# Patient Record
Sex: FEMALE | Race: WHITE | NOT HISPANIC OR LATINO | Employment: FULL TIME | URBAN - METROPOLITAN AREA
[De-identification: names, ages, dates, MRNs, and addresses within clinical notes are randomized per-mention and may not be internally consistent; named-entity substitution may affect disease eponyms.]

---

## 2017-05-14 PROCEDURE — 99283 EMERGENCY DEPT VISIT LOW MDM: CPT

## 2017-05-15 ENCOUNTER — HOSPITAL ENCOUNTER (EMERGENCY)
Facility: HOSPITAL | Age: 27
Discharge: HOME | End: 2017-05-15
Attending: EMERGENCY MEDICINE | Admitting: EMERGENCY MEDICINE

## 2017-05-15 VITALS
DIASTOLIC BLOOD PRESSURE: 79 MMHG | WEIGHT: 111 LBS | TEMPERATURE: 98.5 F | HEART RATE: 71 BPM | BODY MASS INDEX: 20.43 KG/M2 | OXYGEN SATURATION: 100 % | RESPIRATION RATE: 16 BRPM | HEIGHT: 62 IN | SYSTOLIC BLOOD PRESSURE: 103 MMHG

## 2017-05-15 DIAGNOSIS — L23.7 ALLERGIC CONTACT DERMATITIS DUE TO PLANTS, EXCEPT FOOD: Primary | ICD-10-CM

## 2017-05-15 PROCEDURE — 63710000001 PREDNISONE PER 1 MG: Performed by: EMERGENCY MEDICINE

## 2017-05-15 RX ORDER — PREDNISONE 10 MG/1
TABLET ORAL
Qty: 42 TABLET | Refills: 0 | Status: SHIPPED | OUTPATIENT
Start: 2017-05-15 | End: 2021-07-02

## 2017-05-15 RX ORDER — PREDNISONE 20 MG/1
60 TABLET ORAL ONCE
Status: COMPLETED | OUTPATIENT
Start: 2017-05-15 | End: 2017-05-15

## 2017-05-15 RX ORDER — HYDROXYZINE PAMOATE 25 MG/1
25 CAPSULE ORAL 3 TIMES DAILY PRN
Qty: 20 CAPSULE | Refills: 0 | Status: SHIPPED | OUTPATIENT
Start: 2017-05-15 | End: 2021-07-02

## 2017-05-15 RX ADMIN — PREDNISONE 60 MG: 20 TABLET ORAL at 03:20

## 2021-01-28 ENCOUNTER — IMMUNIZATION (OUTPATIENT)
Dept: VACCINE CLINIC | Facility: HOSPITAL | Age: 31
End: 2021-01-28

## 2021-01-28 PROCEDURE — 0001A: CPT | Performed by: INTERNAL MEDICINE

## 2021-01-28 PROCEDURE — 91300 HC SARSCOV02 VAC 30MCG/0.3ML IM: CPT | Performed by: INTERNAL MEDICINE

## 2021-02-18 ENCOUNTER — IMMUNIZATION (OUTPATIENT)
Dept: VACCINE CLINIC | Facility: HOSPITAL | Age: 31
End: 2021-02-18

## 2021-02-18 PROCEDURE — 91300 HC SARSCOV02 VAC 30MCG/0.3ML IM: CPT | Performed by: INTERNAL MEDICINE

## 2021-02-18 PROCEDURE — 0002A: CPT | Performed by: INTERNAL MEDICINE

## 2021-07-02 ENCOUNTER — APPOINTMENT (OUTPATIENT)
Dept: ULTRASOUND IMAGING | Facility: HOSPITAL | Age: 31
End: 2021-07-02

## 2021-07-02 ENCOUNTER — HOSPITAL ENCOUNTER (INPATIENT)
Facility: HOSPITAL | Age: 31
LOS: 3 days | Discharge: HOME | End: 2021-07-05
Attending: EMERGENCY MEDICINE | Admitting: OBSTETRICS & GYNECOLOGY

## 2021-07-02 ENCOUNTER — ANESTHESIA (OUTPATIENT)
Dept: PERIOP | Facility: HOSPITAL | Age: 31
End: 2021-07-02

## 2021-07-02 ENCOUNTER — ANESTHESIA EVENT (OUTPATIENT)
Dept: PERIOP | Facility: HOSPITAL | Age: 31
End: 2021-07-02

## 2021-07-02 DIAGNOSIS — O00.90 ECTOPIC PREGNANCY: ICD-10-CM

## 2021-07-02 DIAGNOSIS — R10.2 PELVIC PAIN: Primary | ICD-10-CM

## 2021-07-02 DIAGNOSIS — Z98.890 POST-OPERATIVE STATE: ICD-10-CM

## 2021-07-02 DIAGNOSIS — O00.90 RUPTURED ECTOPIC PREGNANCY: ICD-10-CM

## 2021-07-02 DIAGNOSIS — O00.80 PREGNANCY, ECTOPIC, CORNUAL OR CERVICAL: ICD-10-CM

## 2021-07-02 LAB
ABO GROUP BLD: NORMAL
ABO GROUP BLD: NORMAL
ALBUMIN SERPL-MCNC: 4.5 G/DL (ref 3.5–5.2)
ALBUMIN/GLOB SERPL: 2 G/DL
ALP SERPL-CCNC: 49 U/L (ref 39–117)
ALT SERPL W P-5'-P-CCNC: 10 U/L (ref 1–33)
ANION GAP SERPL CALCULATED.3IONS-SCNC: 11 MMOL/L (ref 5–15)
AST SERPL-CCNC: 14 U/L (ref 1–32)
BACTERIA UR QL AUTO: ABNORMAL /HPF
BASOPHILS # BLD AUTO: 0.04 10*3/MM3 (ref 0–0.2)
BASOPHILS NFR BLD AUTO: 0.3 % (ref 0–1.5)
BILIRUB SERPL-MCNC: 0.5 MG/DL (ref 0–1.2)
BILIRUB UR QL STRIP: NEGATIVE
BLD GP AB SCN SERPL QL: NEGATIVE
BUN SERPL-MCNC: 8 MG/DL (ref 6–20)
BUN/CREAT SERPL: 11.8 (ref 7–25)
CALCIUM SPEC-SCNC: 10.3 MG/DL (ref 8.6–10.5)
CHLORIDE SERPL-SCNC: 101 MMOL/L (ref 98–107)
CLARITY UR: ABNORMAL
CO2 SERPL-SCNC: 20 MMOL/L (ref 22–29)
COLOR UR: YELLOW
CREAT SERPL-MCNC: 0.68 MG/DL (ref 0.57–1)
DEPRECATED RDW RBC AUTO: 38.6 FL (ref 37–54)
EOSINOPHIL # BLD AUTO: 0 10*3/MM3 (ref 0–0.4)
EOSINOPHIL NFR BLD AUTO: 0 % (ref 0.3–6.2)
ERYTHROCYTE [DISTWIDTH] IN BLOOD BY AUTOMATED COUNT: 12.4 % (ref 12.3–15.4)
GFR SERPL CREATININE-BSD FRML MDRD: 102 ML/MIN/1.73
GLOBULIN UR ELPH-MCNC: 2.2 GM/DL
GLUCOSE SERPL-MCNC: 116 MG/DL (ref 65–99)
GLUCOSE UR STRIP-MCNC: NEGATIVE MG/DL
HCG INTACT+B SERPL-ACNC: 2355 MIU/ML
HCT VFR BLD AUTO: 33.5 % (ref 34–46.6)
HGB BLD-MCNC: 11.6 G/DL (ref 12–15.9)
HGB UR QL STRIP.AUTO: ABNORMAL
HOLD SPECIMEN: NORMAL
HYALINE CASTS UR QL AUTO: ABNORMAL /LPF
IMM GRANULOCYTES # BLD AUTO: 0.05 10*3/MM3 (ref 0–0.05)
IMM GRANULOCYTES NFR BLD AUTO: 0.4 % (ref 0–0.5)
KETONES UR QL STRIP: ABNORMAL
LEUKOCYTE ESTERASE UR QL STRIP.AUTO: ABNORMAL
LIPASE SERPL-CCNC: 16 U/L (ref 13–60)
LYMPHOCYTES # BLD AUTO: 1.72 10*3/MM3 (ref 0.7–3.1)
LYMPHOCYTES NFR BLD AUTO: 12.1 % (ref 19.6–45.3)
MCH RBC QN AUTO: 29.5 PG (ref 26.6–33)
MCHC RBC AUTO-ENTMCNC: 34.6 G/DL (ref 31.5–35.7)
MCV RBC AUTO: 85.2 FL (ref 79–97)
MONOCYTES # BLD AUTO: 0.57 10*3/MM3 (ref 0.1–0.9)
MONOCYTES NFR BLD AUTO: 4 % (ref 5–12)
MUCOUS THREADS URNS QL MICRO: ABNORMAL /HPF
NEUTROPHILS NFR BLD AUTO: 11.78 10*3/MM3 (ref 1.7–7)
NEUTROPHILS NFR BLD AUTO: 83.2 % (ref 42.7–76)
NITRITE UR QL STRIP: NEGATIVE
NRBC BLD AUTO-RTO: 0 /100 WBC (ref 0–0.2)
PH UR STRIP.AUTO: 5.5 [PH] (ref 5–8)
PLATELET # BLD AUTO: 237 10*3/MM3 (ref 140–450)
PMV BLD AUTO: 10.1 FL (ref 6–12)
POTASSIUM SERPL-SCNC: 4.1 MMOL/L (ref 3.5–5.2)
PROT SERPL-MCNC: 6.7 G/DL (ref 6–8.5)
PROT UR QL STRIP: ABNORMAL
RBC # BLD AUTO: 3.93 10*6/MM3 (ref 3.77–5.28)
RBC # UR: ABNORMAL /HPF
REF LAB TEST METHOD: ABNORMAL
RH BLD: POSITIVE
RH BLD: POSITIVE
SARS-COV-2 RDRP RESP QL NAA+PROBE: NORMAL
SODIUM SERPL-SCNC: 132 MMOL/L (ref 136–145)
SP GR UR STRIP: 1.03 (ref 1–1.03)
SQUAMOUS #/AREA URNS HPF: ABNORMAL /HPF
T&S EXPIRATION DATE: NORMAL
UROBILINOGEN UR QL STRIP: ABNORMAL
WBC # BLD AUTO: 14.16 10*3/MM3 (ref 3.4–10.8)
WBC UR QL AUTO: ABNORMAL /HPF
WHOLE BLOOD HOLD SPECIMEN: NORMAL

## 2021-07-02 PROCEDURE — 25010000002 SUCCINYLCHOLINE PER 20 MG: Performed by: ANESTHESIOLOGY

## 2021-07-02 PROCEDURE — 88305 TISSUE EXAM BY PATHOLOGIST: CPT | Performed by: OBSTETRICS & GYNECOLOGY

## 2021-07-02 PROCEDURE — 84702 CHORIONIC GONADOTROPIN TEST: CPT | Performed by: EMERGENCY MEDICINE

## 2021-07-02 PROCEDURE — 10T20ZZ RESECTION OF PRODUCTS OF CONCEPTION, ECTOPIC, OPEN APPROACH: ICD-10-PCS | Performed by: OBSTETRICS & GYNECOLOGY

## 2021-07-02 PROCEDURE — 85025 COMPLETE CBC W/AUTO DIFF WBC: CPT | Performed by: EMERGENCY MEDICINE

## 2021-07-02 PROCEDURE — C1889 IMPLANT/INSERT DEVICE, NOC: HCPCS | Performed by: OBSTETRICS & GYNECOLOGY

## 2021-07-02 PROCEDURE — G0378 HOSPITAL OBSERVATION PER HR: HCPCS

## 2021-07-02 PROCEDURE — 25010000002 ONDANSETRON PER 1 MG: Performed by: EMERGENCY MEDICINE

## 2021-07-02 PROCEDURE — 25010000002 PROPOFOL 10 MG/ML EMULSION: Performed by: ANESTHESIOLOGY

## 2021-07-02 PROCEDURE — 25010000002 FENTANYL CITRATE (PF) 50 MCG/ML SOLUTION: Performed by: ANESTHESIOLOGY

## 2021-07-02 PROCEDURE — 86900 BLOOD TYPING SEROLOGIC ABO: CPT

## 2021-07-02 PROCEDURE — 99284 EMERGENCY DEPT VISIT MOD MDM: CPT

## 2021-07-02 PROCEDURE — 25010000003 CEFAZOLIN IN DEXTROSE 2-4 GM/100ML-% SOLUTION: Performed by: OBSTETRICS & GYNECOLOGY

## 2021-07-02 PROCEDURE — 81001 URINALYSIS AUTO W/SCOPE: CPT | Performed by: EMERGENCY MEDICINE

## 2021-07-02 PROCEDURE — 25010000002 NEOSTIGMINE 10 MG/10ML SOLUTION: Performed by: ANESTHESIOLOGY

## 2021-07-02 PROCEDURE — 87635 SARS-COV-2 COVID-19 AMP PRB: CPT | Performed by: EMERGENCY MEDICINE

## 2021-07-02 PROCEDURE — 99024 POSTOP FOLLOW-UP VISIT: CPT | Performed by: OBSTETRICS & GYNECOLOGY

## 2021-07-02 PROCEDURE — 80053 COMPREHEN METABOLIC PANEL: CPT | Performed by: EMERGENCY MEDICINE

## 2021-07-02 PROCEDURE — 25010000002 ONDANSETRON PER 1 MG: Performed by: ANESTHESIOLOGY

## 2021-07-02 PROCEDURE — 25010000002 HYDROMORPHONE PER 4 MG: Performed by: ANESTHESIOLOGY

## 2021-07-02 PROCEDURE — 0UJD4ZZ INSPECTION OF UTERUS AND CERVIX, PERCUTANEOUS ENDOSCOPIC APPROACH: ICD-10-PCS | Performed by: OBSTETRICS & GYNECOLOGY

## 2021-07-02 PROCEDURE — 86901 BLOOD TYPING SEROLOGIC RH(D): CPT

## 2021-07-02 PROCEDURE — 86850 RBC ANTIBODY SCREEN: CPT | Performed by: EMERGENCY MEDICINE

## 2021-07-02 PROCEDURE — 86900 BLOOD TYPING SEROLOGIC ABO: CPT | Performed by: EMERGENCY MEDICINE

## 2021-07-02 PROCEDURE — 0W9J0ZZ DRAINAGE OF PELVIC CAVITY, OPEN APPROACH: ICD-10-PCS | Performed by: OBSTETRICS & GYNECOLOGY

## 2021-07-02 PROCEDURE — 76817 TRANSVAGINAL US OBSTETRIC: CPT

## 2021-07-02 PROCEDURE — 59136 TREAT ECTOPIC PREGNANCY: CPT | Performed by: OBSTETRICS & GYNECOLOGY

## 2021-07-02 PROCEDURE — 86923 COMPATIBILITY TEST ELECTRIC: CPT

## 2021-07-02 PROCEDURE — 83690 ASSAY OF LIPASE: CPT | Performed by: EMERGENCY MEDICINE

## 2021-07-02 PROCEDURE — 25010000002 DEXAMETHASONE PER 1 MG: Performed by: ANESTHESIOLOGY

## 2021-07-02 PROCEDURE — 25010000002 HYDROMORPHONE PER 4 MG: Performed by: EMERGENCY MEDICINE

## 2021-07-02 PROCEDURE — 86901 BLOOD TYPING SEROLOGIC RH(D): CPT | Performed by: EMERGENCY MEDICINE

## 2021-07-02 DEVICE — ABSORBABLE HEMOSTAT (OXIDIZED REGENERATED CELLULOSE, U.S.P.)
Type: IMPLANTABLE DEVICE | Site: ABDOMEN | Status: FUNCTIONAL
Brand: SURGICEL

## 2021-07-02 RX ORDER — MEPERIDINE HYDROCHLORIDE 50 MG/ML
25 INJECTION INTRAMUSCULAR; INTRAVENOUS; SUBCUTANEOUS
Status: DISCONTINUED | OUTPATIENT
Start: 2021-07-02 | End: 2021-07-03

## 2021-07-02 RX ORDER — DROPERIDOL 2.5 MG/ML
0.62 INJECTION, SOLUTION INTRAMUSCULAR; INTRAVENOUS
Status: CANCELLED | OUTPATIENT
Start: 2021-07-02

## 2021-07-02 RX ORDER — ROCURONIUM BROMIDE 10 MG/ML
INJECTION, SOLUTION INTRAVENOUS AS NEEDED
Status: DISCONTINUED | OUTPATIENT
Start: 2021-07-02 | End: 2021-07-02 | Stop reason: SURG

## 2021-07-02 RX ORDER — ONDANSETRON 2 MG/ML
4 INJECTION INTRAMUSCULAR; INTRAVENOUS EVERY 6 HOURS PRN
Status: DISCONTINUED | OUTPATIENT
Start: 2021-07-02 | End: 2021-07-05 | Stop reason: HOSPADM

## 2021-07-02 RX ORDER — SUCCINYLCHOLINE CHLORIDE 20 MG/ML
INJECTION INTRAMUSCULAR; INTRAVENOUS AS NEEDED
Status: DISCONTINUED | OUTPATIENT
Start: 2021-07-02 | End: 2021-07-02 | Stop reason: SURG

## 2021-07-02 RX ORDER — ACETAMINOPHEN 325 MG/1
650 TABLET ORAL EVERY 6 HOURS PRN
Status: DISCONTINUED | OUTPATIENT
Start: 2021-07-02 | End: 2021-07-05 | Stop reason: HOSPADM

## 2021-07-02 RX ORDER — PROPOFOL 10 MG/ML
VIAL (ML) INTRAVENOUS AS NEEDED
Status: DISCONTINUED | OUTPATIENT
Start: 2021-07-02 | End: 2021-07-02 | Stop reason: SURG

## 2021-07-02 RX ORDER — ONDANSETRON 2 MG/ML
INJECTION INTRAMUSCULAR; INTRAVENOUS AS NEEDED
Status: DISCONTINUED | OUTPATIENT
Start: 2021-07-02 | End: 2021-07-02 | Stop reason: SURG

## 2021-07-02 RX ORDER — SODIUM CHLORIDE 9 MG/ML
10 INJECTION INTRAVENOUS AS NEEDED
Status: DISCONTINUED | OUTPATIENT
Start: 2021-07-02 | End: 2021-07-02

## 2021-07-02 RX ORDER — DOCUSATE SODIUM 100 MG/1
100 CAPSULE, LIQUID FILLED ORAL 2 TIMES DAILY PRN
Status: DISCONTINUED | OUTPATIENT
Start: 2021-07-02 | End: 2021-07-03

## 2021-07-02 RX ORDER — IBUPROFEN 600 MG/1
600 TABLET ORAL EVERY 6 HOURS PRN
Status: DISCONTINUED | OUTPATIENT
Start: 2021-07-02 | End: 2021-07-05 | Stop reason: HOSPADM

## 2021-07-02 RX ORDER — SODIUM CHLORIDE, SODIUM LACTATE, POTASSIUM CHLORIDE, CALCIUM CHLORIDE 600; 310; 30; 20 MG/100ML; MG/100ML; MG/100ML; MG/100ML
INJECTION, SOLUTION INTRAVENOUS CONTINUOUS PRN
Status: DISCONTINUED | OUTPATIENT
Start: 2021-07-02 | End: 2021-07-02 | Stop reason: SURG

## 2021-07-02 RX ORDER — CEFAZOLIN SODIUM 2 G/100ML
2 INJECTION, SOLUTION INTRAVENOUS ONCE
Status: COMPLETED | OUTPATIENT
Start: 2021-07-02 | End: 2021-07-02

## 2021-07-02 RX ORDER — KETOROLAC TROMETHAMINE 30 MG/ML
30 INJECTION, SOLUTION INTRAMUSCULAR; INTRAVENOUS EVERY 6 HOURS PRN
Status: DISPENSED | OUTPATIENT
Start: 2021-07-02 | End: 2021-07-03

## 2021-07-02 RX ORDER — FENTANYL CITRATE 50 UG/ML
50 INJECTION, SOLUTION INTRAMUSCULAR; INTRAVENOUS
Status: DISCONTINUED | OUTPATIENT
Start: 2021-07-02 | End: 2021-07-02 | Stop reason: HOSPADM

## 2021-07-02 RX ORDER — BUPIVACAINE HYDROCHLORIDE AND EPINEPHRINE 5; 5 MG/ML; UG/ML
INJECTION, SOLUTION PERINEURAL AS NEEDED
Status: DISCONTINUED | OUTPATIENT
Start: 2021-07-02 | End: 2021-07-02 | Stop reason: HOSPADM

## 2021-07-02 RX ORDER — HYDROMORPHONE HYDROCHLORIDE 1 MG/ML
0.5 INJECTION, SOLUTION INTRAMUSCULAR; INTRAVENOUS; SUBCUTANEOUS
Status: DISCONTINUED | OUTPATIENT
Start: 2021-07-02 | End: 2021-07-02 | Stop reason: HOSPADM

## 2021-07-02 RX ORDER — ONDANSETRON 2 MG/ML
4 INJECTION INTRAMUSCULAR; INTRAVENOUS ONCE
Status: COMPLETED | OUTPATIENT
Start: 2021-07-02 | End: 2021-07-02

## 2021-07-02 RX ORDER — NEOSTIGMINE METHYLSULFATE 1 MG/ML
INJECTION, SOLUTION INTRAVENOUS AS NEEDED
Status: DISCONTINUED | OUTPATIENT
Start: 2021-07-02 | End: 2021-07-02 | Stop reason: SURG

## 2021-07-02 RX ORDER — FENTANYL CITRATE 50 UG/ML
INJECTION, SOLUTION INTRAMUSCULAR; INTRAVENOUS AS NEEDED
Status: DISCONTINUED | OUTPATIENT
Start: 2021-07-02 | End: 2021-07-02 | Stop reason: SDUPTHER

## 2021-07-02 RX ORDER — SODIUM CHLORIDE 9 MG/ML
125 INJECTION, SOLUTION INTRAVENOUS CONTINUOUS
Status: DISCONTINUED | OUTPATIENT
Start: 2021-07-02 | End: 2021-07-02

## 2021-07-02 RX ORDER — DEXAMETHASONE SODIUM PHOSPHATE 4 MG/ML
INJECTION, SOLUTION INTRA-ARTICULAR; INTRALESIONAL; INTRAMUSCULAR; INTRAVENOUS; SOFT TISSUE AS NEEDED
Status: DISCONTINUED | OUTPATIENT
Start: 2021-07-02 | End: 2021-07-02 | Stop reason: SURG

## 2021-07-02 RX ORDER — LIDOCAINE HYDROCHLORIDE 10 MG/ML
INJECTION, SOLUTION EPIDURAL; INFILTRATION; INTRACAUDAL; PERINEURAL AS NEEDED
Status: DISCONTINUED | OUTPATIENT
Start: 2021-07-02 | End: 2021-07-02 | Stop reason: SURG

## 2021-07-02 RX ORDER — HYDROMORPHONE HYDROCHLORIDE 1 MG/ML
0.5 INJECTION, SOLUTION INTRAMUSCULAR; INTRAVENOUS; SUBCUTANEOUS ONCE
Status: COMPLETED | OUTPATIENT
Start: 2021-07-02 | End: 2021-07-02

## 2021-07-02 RX ORDER — SODIUM CHLORIDE 9 MG/ML
INJECTION, SOLUTION INTRAVENOUS AS NEEDED
Status: DISCONTINUED | OUTPATIENT
Start: 2021-07-02 | End: 2021-07-02 | Stop reason: HOSPADM

## 2021-07-02 RX ORDER — ONDANSETRON 4 MG/1
4 TABLET, FILM COATED ORAL EVERY 6 HOURS PRN
Status: DISCONTINUED | OUTPATIENT
Start: 2021-07-02 | End: 2021-07-05 | Stop reason: HOSPADM

## 2021-07-02 RX ORDER — SODIUM CHLORIDE, SODIUM LACTATE, POTASSIUM CHLORIDE, CALCIUM CHLORIDE 600; 310; 30; 20 MG/100ML; MG/100ML; MG/100ML; MG/100ML
125 INJECTION, SOLUTION INTRAVENOUS CONTINUOUS
Status: DISCONTINUED | OUTPATIENT
Start: 2021-07-02 | End: 2021-07-03

## 2021-07-02 RX ORDER — EPHEDRINE SULFATE 50 MG/ML
INJECTION, SOLUTION INTRAVENOUS AS NEEDED
Status: DISCONTINUED | OUTPATIENT
Start: 2021-07-02 | End: 2021-07-02 | Stop reason: SURG

## 2021-07-02 RX ORDER — GLYCOPYRROLATE 0.2 MG/ML
INJECTION INTRAMUSCULAR; INTRAVENOUS AS NEEDED
Status: DISCONTINUED | OUTPATIENT
Start: 2021-07-02 | End: 2021-07-02 | Stop reason: SURG

## 2021-07-02 RX ORDER — OXYCODONE HYDROCHLORIDE 5 MG/1
5 TABLET ORAL EVERY 4 HOURS PRN
Status: DISCONTINUED | OUTPATIENT
Start: 2021-07-02 | End: 2021-07-05 | Stop reason: HOSPADM

## 2021-07-02 RX ADMIN — SODIUM CHLORIDE, POTASSIUM CHLORIDE, SODIUM LACTATE AND CALCIUM CHLORIDE 125 ML/HR: 600; 310; 30; 20 INJECTION, SOLUTION INTRAVENOUS at 22:39

## 2021-07-02 RX ADMIN — HYDROMORPHONE HYDROCHLORIDE 0.5 MG: 1 INJECTION, SOLUTION INTRAMUSCULAR; INTRAVENOUS; SUBCUTANEOUS at 22:11

## 2021-07-02 RX ADMIN — HYDROMORPHONE HYDROCHLORIDE 0.5 MG: 1 INJECTION, SOLUTION INTRAMUSCULAR; INTRAVENOUS; SUBCUTANEOUS at 18:58

## 2021-07-02 RX ADMIN — ONDANSETRON 4 MG: 2 INJECTION INTRAMUSCULAR; INTRAVENOUS at 18:58

## 2021-07-02 RX ADMIN — FENTANYL CITRATE 50 MCG: 50 INJECTION, SOLUTION INTRAMUSCULAR; INTRAVENOUS at 21:05

## 2021-07-02 RX ADMIN — EPHEDRINE SULFATE 10 MG: 50 INJECTION, SOLUTION INTRAVENOUS at 19:44

## 2021-07-02 RX ADMIN — Medication 110 MG: at 19:31

## 2021-07-02 RX ADMIN — SODIUM CHLORIDE 500 ML: 9 INJECTION, SOLUTION INTRAVENOUS at 19:00

## 2021-07-02 RX ADMIN — SODIUM CHLORIDE, POTASSIUM CHLORIDE, SODIUM LACTATE AND CALCIUM CHLORIDE: 600; 310; 30; 20 INJECTION, SOLUTION INTRAVENOUS at 19:30

## 2021-07-02 RX ADMIN — ROCURONIUM BROMIDE 5 MG: 10 INJECTION INTRAVENOUS at 19:30

## 2021-07-02 RX ADMIN — ROCURONIUM BROMIDE 10 MG: 10 INJECTION INTRAVENOUS at 20:30

## 2021-07-02 RX ADMIN — ROCURONIUM BROMIDE 30 MG: 10 INJECTION INTRAVENOUS at 19:40

## 2021-07-02 RX ADMIN — FENTANYL CITRATE 25 MCG: 50 INJECTION, SOLUTION INTRAMUSCULAR; INTRAVENOUS at 19:56

## 2021-07-02 RX ADMIN — CEFAZOLIN SODIUM 2 G: 10 INJECTION, POWDER, FOR SOLUTION INTRAVENOUS at 19:40

## 2021-07-02 RX ADMIN — NEOSTIGMINE 3 MG: 1 INJECTION INTRAVENOUS at 21:11

## 2021-07-02 RX ADMIN — FENTANYL CITRATE 50 MCG: 50 INJECTION, SOLUTION INTRAMUSCULAR; INTRAVENOUS at 21:25

## 2021-07-02 RX ADMIN — GLYCOPYRROLATE 0.5 MG: 0.4 INJECTION INTRAMUSCULAR; INTRAVENOUS at 21:11

## 2021-07-02 RX ADMIN — ONDANSETRON 4 MG: 2 INJECTION INTRAMUSCULAR; INTRAVENOUS at 21:02

## 2021-07-02 RX ADMIN — ROCURONIUM BROMIDE 10 MG: 10 INJECTION INTRAVENOUS at 20:51

## 2021-07-02 RX ADMIN — PROPOFOL 150 MG: 10 INJECTION, EMULSION INTRAVENOUS at 19:30

## 2021-07-02 RX ADMIN — LIDOCAINE HYDROCHLORIDE 50 MG: 10 INJECTION, SOLUTION EPIDURAL; INFILTRATION; INTRACAUDAL; PERINEURAL at 19:30

## 2021-07-02 RX ADMIN — FENTANYL CITRATE 25 MCG: 50 INJECTION, SOLUTION INTRAMUSCULAR; INTRAVENOUS at 19:49

## 2021-07-02 RX ADMIN — FENTANYL CITRATE 50 MCG: 50 INJECTION, SOLUTION INTRAMUSCULAR; INTRAVENOUS at 19:30

## 2021-07-02 RX ADMIN — DEXAMETHASONE SODIUM PHOSPHATE 4 MG: 4 INJECTION, SOLUTION INTRA-ARTICULAR; INTRALESIONAL; INTRAMUSCULAR; INTRAVENOUS; SOFT TISSUE at 19:41

## 2021-07-02 RX ADMIN — HYDROMORPHONE HYDROCHLORIDE 0.5 MG: 1 INJECTION, SOLUTION INTRAMUSCULAR; INTRAVENOUS; SUBCUTANEOUS at 21:35

## 2021-07-02 NOTE — ANESTHESIA PROCEDURE NOTES
Airway  Urgency: elective    Date/Time: 7/2/2021 7:34 PM  Airway not difficult    General Information and Staff    Patient location during procedure: OR  Anesthesiologist: Delia Fonseca DO    Indications and Patient Condition  Indications for airway management: airway protection    Preoxygenated: yes  MILS not maintained throughout  Mask difficulty assessment: 0 - not attempted    Final Airway Details  Final airway type: endotracheal airway      Successful airway: ETT  Cuffed: yes   Successful intubation technique: direct laryngoscopy and RSI  Facilitating devices/methods: cricoid pressure  Endotracheal tube insertion site: oral  Blade: Xavi  Blade size: 3  ETT size (mm): 6.5  Cormack-Lehane Classification: grade IIa - partial view of glottis  Placement verified by: capnometry   Measured from: lips  ETT/EBT  to lips (cm): 21  Number of attempts at approach: 1  Assessment: lips, teeth, and gum same as pre-op and atraumatic intubation    Additional Comments  Negative epigastric sounds, Breath sound equal bilaterally with symmetric chest rise and fall

## 2021-07-02 NOTE — ED PROVIDER NOTES
Subjective   31-year-old female presents to the emergency department with abdominal pain    Patient is a  with last delivery 7 years ago.  LMP is .  She just found out that she was pregnant.  This morning at approximately 10:00 she had onset of lower abdominal pain.  It was more in the right pelvis this morning but now is diffusely uncomfortable in the lower abdomen graded 10/10.  She reports some nausea with the increased pain.  She said no trauma or injury.  She denies any fevers but has had some cold chills with the discomfort.  She has no chest pain palpitations or shortness of breath.  She has never had discomfort like this in the past.  She has no history of ectopic pregnancies.    She denies any diarrhea but it did have several bowel movements after onset of pain today.  No BRBPR or melena.  She denies any dysuria frequency urgency or hematuria    She denies any loss of taste or smell or coronavirus exposure    Patient has had both her coronavirus immunizations.          Review of Systems   Constitutional: Positive for chills. Negative for fever.   HENT: Negative.  Negative for congestion.    Eyes: Negative.    Respiratory: Negative.  Negative for shortness of breath.    Cardiovascular: Negative.  Negative for chest pain and palpitations.   Gastrointestinal: Positive for abdominal pain.   Genitourinary: Positive for pelvic pain. Negative for dysuria.   Skin: Negative.    Neurological: Negative.    All other systems reviewed and are negative.      History reviewed. No pertinent past medical history.    Allergies   Allergen Reactions   • Sulfa Antibiotics Unknown - Low Severity     UNKNOWN-MOTHER TOLD ME       Past Surgical History:   Procedure Laterality Date   • THROAT SURGERY      POLYPS ON VOCAL CORDS    • TONSILLECTOMY         History reviewed. No pertinent family history.    Social History     Socioeconomic History   • Marital status:      Spouse name: Not on file   • Number of children:  Not on file   • Years of education: Not on file   • Highest education level: Not on file   Tobacco Use   • Smoking status: Former Smoker     Packs/day: 0.00   Vaping Use   • Vaping Use: Never assessed   Substance and Sexual Activity   • Alcohol use: Never   • Drug use: No   • Sexual activity: Defer           Objective   Physical Exam  Vitals and nursing note reviewed.   Constitutional:       General: She is not in acute distress.  HENT:      Head: Normocephalic and atraumatic.      Nose: Nose normal.   Eyes:      Conjunctiva/sclera: Conjunctivae normal.   Cardiovascular:      Rate and Rhythm: Regular rhythm. Tachycardia present.      Heart sounds: Normal heart sounds. No murmur heard.     Pulmonary:      Effort: Pulmonary effort is normal. No respiratory distress.      Breath sounds: Normal breath sounds. No wheezing or rales.   Chest:      Chest wall: No tenderness.   Abdominal:      General: Bowel sounds are normal. There is no distension.      Palpations: Abdomen is soft.      Tenderness: There is abdominal tenderness in the right lower quadrant, suprapubic area and left lower quadrant. There is no guarding or rebound.   Musculoskeletal:         General: Normal range of motion.      Cervical back: Normal range of motion.   Skin:     General: Skin is warm and dry.   Neurological:      General: No focal deficit present.      Mental Status: She is alert and oriented to person, place, and time.         Procedures           ED Course  ED Course as of Jul 02 2132 Fri Jul 02, 2021 1824 Seen and evaluated patient in the ED.  I discussed the ultrasound personally with Dr. Garcia.  I have paged Dr. Manning who is on-call for OB.  We will alert the OR.  The patient is typed and screened    [HH]   1839 Discussed with Dr. Read, on-call for OB/GYN.  I discussed the ultrasound findings.  Beta hCG is pending.  An Abbott Covid swab is pending.  She is in route to the hospital and will evaluate the patient for definitive  care.    []   1840 The OR is notified and she has a room available on hold.  Patient's typed and crossed for 2 units of blood.  Beta hCG is pending    [HH]   1900 Patient is transferred to the OR for definitive care    []      ED Course User Index  [] Jaylan Matias MD                                           [facility]    Final diagnoses:   Pelvic pain   Ruptured ectopic pregnancy       ED Disposition  ED Disposition     ED Disposition Condition Comment    Decision to Admit            No follow-up provider specified.       Medication List      No changes were made to your prescriptions during this visit.          Jaylan Matias MD  07/02/21 4309

## 2021-07-02 NOTE — ANESTHESIA PREPROCEDURE EVALUATION
Anesthesia Evaluation     Patient summary reviewed and Nursing notes reviewed   NPO Solid Status: > 6 hours  NPO Liquid Status: > 2 hours           Airway   Mallampati: II  TM distance: >3 FB  Neck ROM: full  No difficulty expected  Dental - normal exam     Pulmonary - normal exam   (+) a smoker Former,   (-) asthma, sleep apnea  Cardiovascular - normal exam  Exercise tolerance: good (4-7 METS)        Neuro/Psych  (-) seizures  GI/Hepatic/Renal/Endo    (-) GERD, diabetes, no thyroid disorder    Musculoskeletal     Abdominal    Substance History   (+) drug use (occas MJ)     OB/GYN          Other        ROS/Med Hx Other: hgb 11.6 k 4.1  Hx VC polyps                Anesthesia Plan    ASA 2 - emergent     general   Rapid sequence(Risks and benefits of general anesthesia discussed with patient (including MI, CVA, death, recall, aspiration), questions answered, agreeable to proceed.  )  intravenous induction     Anesthetic plan, all risks, benefits, and alternatives have been provided, discussed and informed consent has been obtained with: patient.  Use of blood products discussed with patient  Consented to blood products.

## 2021-07-02 NOTE — CONSULTS
Constantino  Bernadette Munroe  : 1990  MRN: [id number]  CSN: [id number]    Consult Requested By: Dr. Matias   Consulting Service: Gynecology   Reason for Consultation: Ruptured ectopic pregnancy   Date of consultation: 2021       Subjective   Bernadette Munroe is a 31 y.o. year old  who presented to the ER earlier this evening with severe lower right quadrant pain.  She reports severe onset of pain on the right side that she thought she was gas but continue to progress around 10 AM this morning.  She is currently about 6 weeks pregnant based on LMP on May 8 10th of this year.  Patient with severe abdominal pain in the ER and tachycardic to 120s to 160s.    History reviewed. No pertinent past medical history.  Past Surgical History:   Procedure Laterality Date   • THROAT SURGERY      POLYPS ON VOCAL CORDS    • TONSILLECTOMY       OB History    Para Term  AB Living   1 0 0 0 0 0   SAB TAB Ectopic Molar Multiple Live Births   0 0 0 0 0 0      # Outcome Date GA Lbr Deonte/2nd Weight Sex Delivery Anes PTL Lv   1 Current              Social History    Tobacco Use      Smoking status: Former Smoker        Packs/day: 0.00      Current Facility-Administered Medications:   •  [MAR Hold] Sodium Chloride (PF) 0.9 % 10 mL, 10 mL, Intravenous, PRN, Jaylan Matias MD  •  sodium chloride 0.9 % bolus 500 mL, 500 mL, Intravenous, Once, Jaylan Matias MD, Last Rate: 1,000 mL/hr at 21 1900, 500 mL at 21 1900  •  sodium chloride 0.9 % infusion, 125 mL/hr, Intravenous, Continuous, Jaylan Matias MD    Allergies   Allergen Reactions   • Sulfa Antibiotics Unknown - Low Severity     UNKNOWN-MOTHER TOLD ME       Review of Systems   Constitutional: Negative for chills and fever.   HENT: Negative for congestion and sore throat.    Respiratory: Negative for shortness of breath and wheezing.    Cardiovascular: Negative for chest pain and palpitations.   Gastrointestinal: Positive for abdominal  "distention, abdominal pain and nausea. Negative for vomiting.   Genitourinary: Negative for frequency, vaginal bleeding and vaginal pain.   Musculoskeletal: Negative for back pain and myalgias.   Neurological: Negative for dizziness, light-headedness and headaches.   Psychiatric/Behavioral: Negative for confusion. The patient is not nervous/anxious.          Objective   /66 (BP Location: Right arm, Patient Position: Lying)   Pulse 97   Temp 98.6 °F (37 °C) (Temporal)   Resp 18   Ht 157.5 cm (62\")   Wt 50.8 kg (112 lb)   LMP 05/18/2021   SpO2 100%   BMI 20.49 kg/m²   General: well developed; well nourished  shaky   Heart: Not performed.   Lungs: breathing is unlabored   Abdomen: no umbilical or inguinal hernias are present  no hepato-splenomegaly  TTP throughout with some mild guarding    Pelvis:: Not performed.   Labs  CBC:   Lab Results   Component Value Date     07/02/2021    HGB 11.6 (L) 07/02/2021    HCT 33.5 (L) 07/02/2021    WBC 14.16 (H) 07/02/2021     CBC w/ diff:   Lab Results   Component Value Date     07/02/2021    HGB 11.6 (L) 07/02/2021    HCT 33.5 (L) 07/02/2021    MCV 85.2 07/02/2021    RDW 12.4 07/02/2021    WBC 14.16 (H) 07/02/2021    NEUTRORELPCT 83.2 (H) 07/02/2021    AUTOIGPER 0.4 07/02/2021    LYMPHORELPCT 12.1 (L) 07/02/2021    MONORELPCT 4.0 (L) 07/02/2021    EOSRELPCT 0.0 (L) 07/02/2021    BASORELPCT 0.3 07/02/2021     CMP:   Lab Results   Component Value Date     (L) 07/02/2021    K 4.1 07/02/2021     07/02/2021    CO2 20.0 (L) 07/02/2021    BUN 8 07/02/2021    CREATININE 0.68 07/02/2021    GLUCOSE 116 (H) 07/02/2021    ALBUMIN 4.50 07/02/2021    CALCIUM 10.3 07/02/2021    AST 14 07/02/2021    ALT 10 07/02/2021    BILITOT 0.5 07/02/2021     HCG:   Lab Results   Component Value Date    HCGQUANT 2,355.00 07/02/2021       Imaging Reviewed  Concern for ruptured ectopic based on free fluid seen in the cul-de-sac and surrounding uterus with adnexal structure " concerning for right ectopic pregnancy       Assessment   1. Ruptured ectopic pregnancy     Plan   1. Plan to proceed with diagnostic laparoscopy with most likely right salpingectomy.  Discussed plan of care with patient in detail  2. Today I discussed with Bernadette the risks of her upcoming surgical procedure. Risks including intraoperative bleeding, infection at the site of surgery, damage to the adjacent surrounding organs, catheter induced urinary tract infections and the small risk for deep vein thrombosis were all explained. Additionally, the small risk for reoperation in the event of unanticipated bleeding or surgical injury was discussed.  All of her questions were answered fully.  She left with a very clear understanding of the preoperative surgical indications and the nature of the surgery for which she is scheduled.  She understands to be NPO after midnight and to be at the preoperative area ~ 1-1/2 hours prior to the scheduled surgical start time.    My findings from today's consultation along with recommendations and plan of care have been discussed with Dr. Matias and patient in detail.     Stella Read MD  7/2/2021  19:19 EDT

## 2021-07-03 PROBLEM — R10.2 PELVIC PAIN: Status: ACTIVE | Noted: 2021-07-03

## 2021-07-03 LAB
DEPRECATED RDW RBC AUTO: 40.3 FL (ref 37–54)
DEPRECATED RDW RBC AUTO: 40.4 FL (ref 37–54)
ERYTHROCYTE [DISTWIDTH] IN BLOOD BY AUTOMATED COUNT: 12.6 % (ref 12.3–15.4)
ERYTHROCYTE [DISTWIDTH] IN BLOOD BY AUTOMATED COUNT: 12.6 % (ref 12.3–15.4)
HCG INTACT+B SERPL-ACNC: 1463 MIU/ML
HCT VFR BLD AUTO: 21.4 % (ref 34–46.6)
HCT VFR BLD AUTO: 21.9 % (ref 34–46.6)
HGB BLD-MCNC: 7.2 G/DL (ref 12–15.9)
HGB BLD-MCNC: 7.3 G/DL (ref 12–15.9)
MCH RBC QN AUTO: 29.1 PG (ref 26.6–33)
MCH RBC QN AUTO: 29.8 PG (ref 26.6–33)
MCHC RBC AUTO-ENTMCNC: 33.3 G/DL (ref 31.5–35.7)
MCHC RBC AUTO-ENTMCNC: 33.6 G/DL (ref 31.5–35.7)
MCV RBC AUTO: 87.3 FL (ref 79–97)
MCV RBC AUTO: 88.4 FL (ref 79–97)
PLATELET # BLD AUTO: 154 10*3/MM3 (ref 140–450)
PLATELET # BLD AUTO: 173 10*3/MM3 (ref 140–450)
PMV BLD AUTO: 10.5 FL (ref 6–12)
PMV BLD AUTO: 11.1 FL (ref 6–12)
RBC # BLD AUTO: 2.42 10*6/MM3 (ref 3.77–5.28)
RBC # BLD AUTO: 2.51 10*6/MM3 (ref 3.77–5.28)
WBC # BLD AUTO: 11.7 10*3/MM3 (ref 3.4–10.8)
WBC # BLD AUTO: 14.8 10*3/MM3 (ref 3.4–10.8)

## 2021-07-03 PROCEDURE — 85027 COMPLETE CBC AUTOMATED: CPT | Performed by: OBSTETRICS & GYNECOLOGY

## 2021-07-03 PROCEDURE — 84702 CHORIONIC GONADOTROPIN TEST: CPT | Performed by: OBSTETRICS & GYNECOLOGY

## 2021-07-03 PROCEDURE — 99024 POSTOP FOLLOW-UP VISIT: CPT | Performed by: OBSTETRICS & GYNECOLOGY

## 2021-07-03 PROCEDURE — 25010000002 KETOROLAC TROMETHAMINE PER 15 MG: Performed by: OBSTETRICS & GYNECOLOGY

## 2021-07-03 RX ORDER — DOCUSATE SODIUM 100 MG/1
100 CAPSULE, LIQUID FILLED ORAL 2 TIMES DAILY
Status: DISCONTINUED | OUTPATIENT
Start: 2021-07-03 | End: 2021-07-05 | Stop reason: HOSPADM

## 2021-07-03 RX ORDER — CALCIUM CARBONATE 200(500)MG
1 TABLET,CHEWABLE ORAL 3 TIMES DAILY PRN
Status: DISCONTINUED | OUTPATIENT
Start: 2021-07-03 | End: 2021-07-05 | Stop reason: HOSPADM

## 2021-07-03 RX ADMIN — OXYCODONE 5 MG: 5 TABLET ORAL at 16:02

## 2021-07-03 RX ADMIN — OXYCODONE 5 MG: 5 TABLET ORAL at 20:26

## 2021-07-03 RX ADMIN — KETOROLAC TROMETHAMINE 30 MG: 30 INJECTION, SOLUTION INTRAMUSCULAR; INTRAVENOUS at 09:26

## 2021-07-03 RX ADMIN — OXYCODONE 5 MG: 5 TABLET ORAL at 01:06

## 2021-07-03 RX ADMIN — KETOROLAC TROMETHAMINE 30 MG: 30 INJECTION, SOLUTION INTRAMUSCULAR; INTRAVENOUS at 15:13

## 2021-07-03 RX ADMIN — DOCUSATE SODIUM 100 MG: 100 CAPSULE, LIQUID FILLED ORAL at 20:26

## 2021-07-03 RX ADMIN — ANTACID TABLETS 1 TABLET: 500 TABLET, CHEWABLE ORAL at 20:34

## 2021-07-03 RX ADMIN — DOCUSATE SODIUM 100 MG: 100 CAPSULE, LIQUID FILLED ORAL at 13:07

## 2021-07-03 RX ADMIN — KETOROLAC TROMETHAMINE 30 MG: 30 INJECTION, SOLUTION INTRAMUSCULAR; INTRAVENOUS at 00:58

## 2021-07-03 NOTE — PLAN OF CARE
Problem: Adult Inpatient Plan of Care  Goal: Plan of Care Review  Outcome: Ongoing, Progressing  Flowsheets (Taken 7/3/2021 9081)  Progress: improving  Plan of Care Reviewed With: patient  Outcome Summary: VSS. Pain treated with PRN's. Voiding with stimulation of warm water. Incisions intact.

## 2021-07-03 NOTE — PROGRESS NOTES
BENNY Meeks  Bernadette Munroe  : 1990  MRN: [id number]  CSN: [id number]    Post-operative Day #1  CC: post operative follow up  Subjective   Her pain is well controlled. She has not passed gas since surgery. She is voiding spontaneously. She reports pain is much better compared to prior to surgery.  She has tolerated fluids and water overnight with small bites of food this morning.  She denies any nausea or vomiting.  She reports she has had some issues with shaking but this is happened in the past with anytime she gets cold or nervous.  She denies any dizziness or lightheadedness when going up to the bathroom but is not really walked much more than going to the bathroom.  She does report a history of chlamydia when she was younger and potentially HPV in the past.     Objective     Min/max vitals past 24 hours:   Temp  Min: 97.2 °F (36.2 °C)  Max: 98.6 °F (37 °C)  BP  Min: 98/56  Max: 135/90  Pulse  Min: 59  Max: 160  Resp  Min: 16  Max: 26        I/O last 3 completed shifts:  In: 1200 [I.V.:1200]  Out: 1675 [Urine:875; Blood:800]    General: well developed; well nourished  no acute distress   Abdomen: soft, non-tender; no masses  no umbilical or inguinal hernias are present  no hepato-splenomegaly   3 port site incisions c/d/i with dermabond covering  Mini lap incision - bandage and telfa removed - telfa was stuck to right aspect of dermabond and small amount of bleeding noted but hemostatic after pressure held with no active bleeding. Intact    Pelvic: Not performed   Ext: Calves NT     Last 3 values   Results from last 7 days   Lab Units 21  0744 21  1733   WBC 10*3/mm3 14.80* 14.16*   HEMOGLOBIN g/dL 7.3* 11.6*   HEMATOCRIT % 21.9* 33.5*   PLATELETS 10*3/mm3 173 237     Last 3 values   Results from last 7 days   Lab Units 21  0744 21  1733   HCG QUANTITATIVE mIU/mL 1,463.00 2,355.00            Assessment   1. Post-op Day #1 S/P diagnostic laparoscopy with conversion to mini  laparotomy with right cornual/interstitial ectopic wedge resection and right salpingectomy with HCG level tending down this morning    2. Acute blood loss anemia - overall asymptomatic at this time and no current concern for active bleeding.      Plan   1. Continue routine post-operative care  2. Ambulate  3. Advance diet  4. Repeat CBC at noon to see if blood transfusion is indicated. Reviewed with patient and parents would have low threshold ot transfuse 2 units or PRBCs if <7 or she becomes symptomatic. They verbalized understanding of r/b    Stella Read MD  7/3/2021  10:19 EDT

## 2021-07-03 NOTE — OP NOTE
BH Constantino Munroe  : 1990  MRN: [id number]  [facility]: [id number]  Date: 2021    Operative Note          Pre-op Diagnosis:  1.  Ruptured ectopic pregnancy  2. Hemoperitoneum   Post-op Diagnosis:  1.  Ruptured right cornual/interstitial ectopic pregnancy  2. Hemoperitoneum   Procedure: 1.  Diagnostic laparoscopy  2. Right cornual/interstitial ectopic wedge resection with mini laparotomy  3. Evacuation of hemoperitoneum   Surgeon: Stella Read MD     Assist: Ruthie Zurita MD, PA-C  Assistant: Ruthie Vitale PA-C was responsible for performing the following activities: Retraction, Suction, Irrigation, Suturing and Closing and their skilled assistance was necessary for the success of this case.     Anesthesia: General   Estimated Blood Loss: 25 mls  Hemoperitoneum - 800ml   Fluids: 1100 mls   UOP: 75 mls   ABx: cefazolin 2 gms   Specimens:   Right fallopian tube and right cornual ectopic pregnancy wedge resection    Findings:  Omental adhesions to bilateral fallopian tubes and uterus. 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 Bilateral ovaries with adhesions to pelvic sidewall.     Complications:  Conversion to mini laparotomy       Description of Procedure   Patient was taken to the operating room where she was placed in the dorsal supine position and general anesthesia was administered without difficulty.  She was then placed in dorsolithotomy position and arms were tucked at the side with care to prevent any undue nerve damage to the arms or legs.  She was then prepped and draped in normal sterile fashion.  Timeout was performed.  She had a negative Covid test.  Attention was first placed at the perineum and a Hulka clamp was placed and a Barker catheter was placed without difficulty.  Attention was then turned to the abdomen where half percent Marcaine with epinephrine was instilled  just below the umbilicus.  A 5 mm incision was made and then a 5mm trocar was inserted using optiview technique. Gas was turned on and abdomen was insufflated. Hemoperitoneum was visualized (about 800ml including clot) and above procedural findings and patient was placed in trendelenburg. Right lower quadrant 5mm port was inserted under direct visualization after local was used. This was done in the LLQ as well. Harmonic scalpel was used to take down omental adhesions around right fallopian tube. At this point it was evident the ruptured ectopic pregnancy was present close to the right cornual region of the uterus. Decision at this time was made to convert to a mini laparotomy. Mini pfannenstiel was performed using the scalpel and carried down to the underlying fascia. The fascia was scored in the midline and then opened laterally each way with de la fuente scissors. The rectus muscles were dissected away superiorly and inferiorly. The rectus muscles were  in the midline and then the peritoneum was entered with sharp dissection. The abdomen was irrigated with warm saline and hemoperitoneum was evacuated. The uterus was then elevated up and out of the incision. The rupture ectopic pregnancy was evacuated at the cornual region with pressure and then a wedge resection of about 2x2cm in size. The remaining was oversewn in two layers using 0-monocryl suture. The right fallopian tube was removed using harmonic scalpel starting from cornual end all the way to fimbria. Ectopic pregnancy and and right fallopian tube were sent for permanent pathology diagnosis. The wedge resection area was made hemostatic with two additional 0-vicryl sutures and then covered with surgicel. Fascia was then closed with 0-PDS suture. Subcutaneous tissue was closed with 3-0 plain gut. The abdomen was then re-insufflated and all surgicel sites were hemostatic. All ports were removed under directly visualization and skin incisions were closed with  3-0 vicryl and dermabond. Patient was taken to the PACU in stable condition. Plan to repeat HCG level tomorrow and continue to trend weekly.     Stella Read MD   7/2/2021  21:49 EDT

## 2021-07-03 NOTE — ANESTHESIA POSTPROCEDURE EVALUATION
Patient: Bernadette Munroe    Procedure Summary     Date: 07/02/21 Room / [facility]:  [facility]  [facility]    Anesthesia Start: 1930 Anesthesia Stop: 2134    Procedure: DIAGNOSTIC LAPAROSCOPY  Right cornual ectopic wedge resection with mini laparotomy (N/A Abdomen) Diagnosis:     Surgeons: Stella Read MD Provider: Delia Fonseca DO    Anesthesia Type: general ASA Status: 2 - Emergent          Anesthesia Type: general    Vitals  No vitals data found for the desired time range.          Post Anesthesia Care and Evaluation    Patient location during evaluation: PACU  Patient participation: complete - patient participated  Level of consciousness: awake and alert  Pain management: adequate  Airway patency: patent  Anesthetic complications: No anesthetic complications  PONV Status: none  Cardiovascular status: hemodynamically stable and acceptable  Respiratory status: nonlabored ventilation, acceptable and nasal cannula  Hydration status: acceptable    Comments: To PACU on O2NC, breathing comfortably.  Report to PACU RN at bedside. VSS.

## 2021-07-03 NOTE — H&P
Constantino  Bernadette Munroe  :   1990  MRN:   [id number]  CSN:    [id number]     Consult Requested By: Dr. Matias   Consulting Service: Gynecology   Reason for Consultation: Ruptured ectopic pregnancy   Date of consultation: 2021            Benito Munroe is a 31 y.o. year old  who presented to the ER earlier this evening with severe lower right quadrant pain.  She reports severe onset of pain on the right side that she thought she was gas but continue to progress around 10 AM this morning.  She is currently about 6 weeks pregnant based on LMP on May 18th of this year.  Patient with severe abdominal pain in the ER and tachycardic to 120s to 160s.     Medical History   History reviewed. No pertinent past medical history.     Surgical History         Past Surgical History:   Procedure Laterality Date   • THROAT SURGERY         POLYPS ON VOCAL CORDS    • TONSILLECTOMY                               OB History    Para Term  AB Living   1 0 0 0 0 0   SAB TAB Ectopic Molar Multiple Live Births    0 0 0 0 0 0       # Outcome Date GA Lbr Deonte/2nd Weight Sex Delivery Anes PTL Lv   1 Current                        Social History    Tobacco Use      Smoking status: Former Smoker        Packs/day: 0.00        Current Facility-Administered Medications:   •  [MAR Hold] Sodium Chloride (PF) 0.9 % 10 mL, 10 mL, Intravenous, PRN, Jaylan Matias MD  •  sodium chloride 0.9 % bolus 500 mL, 500 mL, Intravenous, Once, Jaylan Matias MD, Last Rate: 1,000 mL/hr at 21 1900, 500 mL at 21 1900  •  sodium chloride 0.9 % infusion, 125 mL/hr, Intravenous, Continuous, Jaylan Matias MD           Allergies   Allergen Reactions   • Sulfa Antibiotics Unknown - Low Severity       UNKNOWN-MOTHER TOLD ME         Review of Systems   Constitutional: Negative for chills and fever.   HENT: Negative for congestion and sore throat.    Respiratory: Negative for shortness of breath and  "wheezing.    Cardiovascular: Negative for chest pain and palpitations.   Gastrointestinal: Positive for abdominal distention, abdominal pain and nausea. Negative for vomiting.   Genitourinary: Negative for frequency, vaginal bleeding and vaginal pain.   Musculoskeletal: Negative for back pain and myalgias.   Neurological: Negative for dizziness, light-headedness and headaches.   Psychiatric/Behavioral: Negative for confusion. The patient is not nervous/anxious.                Objective      /66 (BP Location: Right arm, Patient Position: Lying)   Pulse 97   Temp 98.6 °F (37 °C) (Temporal)   Resp 18   Ht 157.5 cm (62\")   Wt 50.8 kg (112 lb)   LMP 05/18/2021   SpO2 100%   BMI 20.49 kg/m²   General: well developed; well nourished  shaky   Heart: Not performed.   Lungs: breathing is unlabored   Abdomen: no umbilical or inguinal hernias are present  no hepato-splenomegaly  TTP throughout with some mild guarding    Pelvis:: Not performed.   Labs  CBC:         Lab Results   Component Value Date      07/02/2021     HGB 11.6 (L) 07/02/2021     HCT 33.5 (L) 07/02/2021     WBC 14.16 (H) 07/02/2021      CBC w/ diff:         Lab Results   Component Value Date      07/02/2021     HGB 11.6 (L) 07/02/2021     HCT 33.5 (L) 07/02/2021     MCV 85.2 07/02/2021     RDW 12.4 07/02/2021     WBC 14.16 (H) 07/02/2021     NEUTRORELPCT 83.2 (H) 07/02/2021     AUTOIGPER 0.4 07/02/2021     LYMPHORELPCT 12.1 (L) 07/02/2021     MONORELPCT 4.0 (L) 07/02/2021     EOSRELPCT 0.0 (L) 07/02/2021     BASORELPCT 0.3 07/02/2021      CMP:         Lab Results   Component Value Date      (L) 07/02/2021     K 4.1 07/02/2021      07/02/2021     CO2 20.0 (L) 07/02/2021     BUN 8 07/02/2021     CREATININE 0.68 07/02/2021     GLUCOSE 116 (H) 07/02/2021     ALBUMIN 4.50 07/02/2021     CALCIUM 10.3 07/02/2021     AST 14 07/02/2021     ALT 10 07/02/2021     BILITOT 0.5 07/02/2021      HCG:         Lab Results   Component Value " Date     HCGQUANT 2,355.00 07/02/2021         Imaging Reviewed  Concern for ruptured ectopic based on free fluid seen in the cul-de-sac and surrounding uterus with adnexal structure concerning for right ectopic pregnancy              Assessment      1. Ruptured ectopic pregnancy           Plan      1. Plan to proceed with diagnostic laparoscopy with most likely right salpingectomy.  Discussed plan of care with patient in detail  2. Today I discussed with Bernadette the risks of her upcoming surgical procedure. Risks including intraoperative bleeding, infection at the site of surgery, damage to the adjacent surrounding organs, catheter induced urinary tract infections and the small risk for deep vein thrombosis were all explained. Additionally, the small risk for reoperation in the event of unanticipated bleeding or surgical injury was discussed.  All of her questions were answered fully.  She left with a very clear understanding of the preoperative surgical indications and the nature of the surgery for which she is scheduled.  She understands to be NPO after midnight and to be at the preoperative area ~ 1-1/2 hours prior to the scheduled surgical start time.     My findings from today's consultation along with recommendations and plan of care have been discussed with Dr. Matias and patient in detail.      Stella Read MD  7/2/2021  19:19 EDT

## 2021-07-03 NOTE — BRIEF OP NOTE
DIAGNOSTIC LAPAROSCOPY  Progress Note    Bernadette Munroe  7/2/2021    Pre-op Diagnosis:   Ruptured ectopic pregnancy        Post-Op Diagnosis Codes:   Ruptured right cornual ectopic pregnancy   Hemoperitoneum    Procedure/CPT® Codes:        Procedure(s):  DIAGNOSTIC LAPAROSCOPY  Wedge resection of right cornual ectopic with mini laparotomy     Surgeon(s):  Stella Read MD    Anesthesia: General    Staff:   Circulator: Roney Toussaint RN  Scrub Person: Mandeep Chandler  Nursing Assistant: Hailey Ross  Assistant: Ruthie Vitale PA-C  Assistant: Ruthie Vitale PA-C      Estimated Blood Loss: minimal   Hemoperitoneum - 800ml  IVF - 1100ml    Urine Voided: 75ml  Specimens:    Right cornual ectopic, Right fallopian tube                  Drains:   Urethral Catheter Silicone 16 Fr. (Active)       Findings: Significant omental adhesions to fallopian tubes and uterus.     Complications: Conversion to mini laparotomy     Assistant: Ruthie Vitale PA-C Curtis High MD  was responsible for performing the following activities: Retraction, Suction, Irrigation, Suturing and Closing and their skilled assistance was necessary for the success of this case.    Stella Read MD     Date: 7/2/2021  Time: 21:19 EDT

## 2021-07-03 NOTE — PLAN OF CARE
Goal Outcome Evaluation:  Plan of Care Reviewed With: patient           Outcome Summary: Pain managed with prn meds. States level 2. Activity tolerated. Incision with scant drainage. Last heart rate 88.

## 2021-07-04 LAB
DEPRECATED RDW RBC AUTO: 42.1 FL (ref 37–54)
ERYTHROCYTE [DISTWIDTH] IN BLOOD BY AUTOMATED COUNT: 13 % (ref 12.3–15.4)
HCT VFR BLD AUTO: 19.6 % (ref 34–46.6)
HGB BLD-MCNC: 6.6 G/DL (ref 12–15.9)
MCH RBC QN AUTO: 29.9 PG (ref 26.6–33)
MCHC RBC AUTO-ENTMCNC: 33.7 G/DL (ref 31.5–35.7)
MCV RBC AUTO: 88.7 FL (ref 79–97)
PLATELET # BLD AUTO: 123 10*3/MM3 (ref 140–450)
PMV BLD AUTO: 10.9 FL (ref 6–12)
RBC # BLD AUTO: 2.21 10*6/MM3 (ref 3.77–5.28)
WBC # BLD AUTO: 5.25 10*3/MM3 (ref 3.4–10.8)

## 2021-07-04 PROCEDURE — P9016 RBC LEUKOCYTES REDUCED: HCPCS

## 2021-07-04 PROCEDURE — 86900 BLOOD TYPING SEROLOGIC ABO: CPT

## 2021-07-04 PROCEDURE — 99024 POSTOP FOLLOW-UP VISIT: CPT | Performed by: OBSTETRICS & GYNECOLOGY

## 2021-07-04 PROCEDURE — 85027 COMPLETE CBC AUTOMATED: CPT | Performed by: OBSTETRICS & GYNECOLOGY

## 2021-07-04 PROCEDURE — 36430 TRANSFUSION BLD/BLD COMPNT: CPT

## 2021-07-04 RX ADMIN — OXYCODONE 5 MG: 5 TABLET ORAL at 19:41

## 2021-07-04 RX ADMIN — DOCUSATE SODIUM 100 MG: 100 CAPSULE, LIQUID FILLED ORAL at 09:02

## 2021-07-04 RX ADMIN — IBUPROFEN 600 MG: 600 TABLET, FILM COATED ORAL at 22:10

## 2021-07-04 RX ADMIN — OXYCODONE 5 MG: 5 TABLET ORAL at 10:25

## 2021-07-04 RX ADMIN — DOCUSATE SODIUM 100 MG: 100 CAPSULE, LIQUID FILLED ORAL at 22:11

## 2021-07-04 RX ADMIN — OXYCODONE 5 MG: 5 TABLET ORAL at 14:31

## 2021-07-04 RX ADMIN — IBUPROFEN 600 MG: 600 TABLET, FILM COATED ORAL at 09:03

## 2021-07-04 NOTE — PLAN OF CARE
Goal Outcome Evaluation:  Plan of Care Reviewed With: patient        Progress: improving  Outcome Summary: Pt received two units of blood. Likely to be discharged in am. Pt eating and drinking without difficulty. No bm but pt reports she is passing gas.

## 2021-07-04 NOTE — DISCHARGE INSTRUCTIONS
Exploratory Laparotomy, Adult, Care After  This sheet gives you information about how to care for yourself after your procedure. Your health care provider may also give you more specific instructions. If you have problems or questions, contact your health care provider.  What can I expect after the procedure?  After the procedure, it is common to have:  · Abdominal soreness.  · Fatigue.  · A sore throat from the tube in your throat.  · A lack of appetite.  Follow these instructions at home:  Medicines  · Take over-the-counter and prescription medicines only as told by your health care provider.  · If you were prescribed an antibiotic medicine, take it as told by your health care provider. Do not stop taking the antibiotic even if you start to feel better.  · Do not drive or operate heavy machinery while taking pain medicine.  · If you are taking prescription pain medicine, take actions to prevent or treat constipation. Your health care provider may recommend that you:  ? Drink enough fluid to keep your urine pale yellow.  ? Eat foods that are high in fiber, such as fresh fruits and vegetables, whole grains, and beans.  ? Limit foods that are high in fat and processed sugars, such as fried or sweet foods.  ? Take an over-the-counter or prescription medicine for constipation. Undergoing surgery and taking pain medicines can make constipation worse.  Incision care    · Follow instructions from your health care provider about how to take care of your incision. Make sure you:  ? Wash your hands with soap and water before you change your bandage (dressing). If soap and water are not available, use hand .  ? Change your dressing as told by your health care provider.  ? Leave stitches (sutures), skin glue, or adhesive strips in place. These skin closures may need to stay in place for 2 weeks or longer. If adhesive strip edges start to loosen and curl up, you may trim the loose edges. Do not remove adhesive strips  completely unless your health care provider tells you to do that.  · If you were sent home with a drain, follow instructions from your health care provider about how to care for it.  · Check your incision area every day for signs of infection. Check for:  ? Redness, swelling, or pain.  ? Fluid or blood.  ? Warmth.  ? Pus or a bad smell.  Activity    · Rest as told by your health care provider.  ? Avoid sitting for a long time without moving. Get up to take short walks every 1-2 hours. This is important to improve blood flow and breathing. Ask for help if you feel weak or unsteady.  · Do not lift anything that is heavier than 5 lb (2.2 kg), or the limit that your health care provider tells you, until he or she says that it is safe.  · Ask your health care provider when you can start to do your usual activities again, such as driving, going back to work, and having sex.  Eating and drinking  · You may eat what you usually eat. Include lots of whole grains, fruits, and vegetables in your diet. This will help to prevent constipation.  · Drink enough fluid to keep your urine pale yellow.  Bathing  · Keep your incision clean and dry. Clean it as often as told by your health care provider:  ? Gently wash the incision with soap and water.  ? Rinse the incision with water to remove all soap.  ? Pat the incision dry with a clean towel. Do not rub the incision.  · You may take showers after 48 hours.  · Do not take baths, swim, or use a hot tub until your health care provider says it is okay to do so.  General instructions  · Do not use any products that contain nicotine or tobacco, such as cigarettes and e-cigarettes. These can delay healing after surgery. If you need help quitting, ask your health care provider.  · Wear compression stockings as told by your health care provider. These stockings help to prevent blood clots and reduce swelling in your legs.  · Keep all follow-up visits as told by your health care provider.  This is important.  Contact a health care provider if:  · You have a fever.  · You have chills.  · Your pain medicine is not helping.  · You have constipation or diarrhea.  · You have nausea or vomiting.  · You have drainage, redness, swelling, or pain at your incision site.  Get help right away if:  · Your pain is getting worse.  · You have not had a bowel movement for more than 3 days.  · You have ongoing (persistent) vomiting.  · The edges of your incision open up.  · You have warmth, tenderness, and swelling in your calf.  · You have trouble breathing.  · You have chest pain.  These symptoms may represent a serious problem that is an emergency. Do not wait to see if the symptoms will go away. Get medical help right away. Call your local emergency services (911 in the [place]). Do not drive yourself to the hospital.  Summary  · Abdominal soreness is common after exploratory laparotomy. Take over-the-counter and prescription pain medicines only as told by your health care provider.  · Follow instructions from your health care provider about how to take care of your incision. Do not take baths, swim, or use a hot tub until your health care provider says it is okay to do so.  · Watch for signs and symptoms of infection after surgery, including fever, chills, drainage from your incision, and worsening abdominal pain.  This information is not intended to replace advice given to you by your health care provider. Make sure you discuss any questions you have with your health care provider.  Document Revised: 02/10/2020 Document Reviewed: 12/28/2018  [employer] Patient Education © 2021 [employer]

## 2021-07-04 NOTE — PLAN OF CARE
Goal Outcome Evaluation:  Plan of Care Reviewed With: patient        Progress: no change  Outcome Summary: Patient has rested well tonight without complaints. Hgb to be rechecked this am. Will monitor.

## 2021-07-04 NOTE — PROGRESS NOTES
BENNY Constantino Munroe  : 1990  MRN: [id number]  CSN: [id number]    Post-operative Day #2  Subjective   Her pain is well controlled. She is passing gas. Her bowels are working normally. She is ambulating. Tolerating regular diet. A little emotional this am.      Objective     Min/max vitals past 24 hours:   Temp  Min: 98 °F (36.7 °C)  Max: 99 °F (37.2 °C)  BP  Min: 99/58  Max: 123/57  Pulse  Min: 85  Max: 116  Resp  Min: 16  Max: 18        I/O last 3 completed shifts:  In: 2020 [P.O.:820; I.V.:1200]  Out: 67 [Urine:5925; Blood:800]    General: well developed; well nourished  no acute distress   Abdomen: soft, non-tender; no masses  no umbilical or inguinal hernias are present  no hepato-splenomegaly  incision is clean, dry, intact and without drainage   Pelvic: Not performed   Ext: Calves NT     Last 3 values   Results from last 7 days   Lab Units 21  0724 21  1211 21  0744   WBC 10*3/mm3 5.25 11.70* 14.80*   HEMOGLOBIN g/dL 6.6* 7.2* 7.3*   HEMATOCRIT % 19.6* 21.4* 21.9*   PLATELETS 10*3/mm3 123* 154 173          Assessment   1. Post-op Day #2 S/P diagnostic laparoscopy with conversion to mini laparotomy with right cornual/interstitial ectopic wedge resection and right salpingectomy   2. Acute blood loss anemia - repeat Hgb 6.6 this morning, asymptomatic, no concern for active bleeding     Plan   1. Continue routine post-operative care   2. Given Hgb <7 with large hemoperitoneum at time of surgery will transfuse 2 units pRBCs. Discussed r/b/a in detail with patient and she provided consent.   3. Repeat CBC in am and if appropriate rise and patient continuing to meet post operative milestones will plan for discharge to home with close outpatient follow up.     Stella Read MD  2021  11:54 EDT

## 2021-07-05 VITALS
BODY MASS INDEX: 20.61 KG/M2 | RESPIRATION RATE: 16 BRPM | DIASTOLIC BLOOD PRESSURE: 57 MMHG | HEART RATE: 74 BPM | SYSTOLIC BLOOD PRESSURE: 93 MMHG | WEIGHT: 112 LBS | TEMPERATURE: 98 F | HEIGHT: 62 IN | OXYGEN SATURATION: 96 %

## 2021-07-05 DIAGNOSIS — O00.80 PREGNANCY, ECTOPIC, CORNUAL OR CERVICAL: Primary | ICD-10-CM

## 2021-07-05 LAB
BH BB BLOOD EXPIRATION DATE: NORMAL
BH BB BLOOD EXPIRATION DATE: NORMAL
BH BB BLOOD TYPE BARCODE: 6200
BH BB BLOOD TYPE BARCODE: 6200
BH BB DISPENSE STATUS: NORMAL
BH BB DISPENSE STATUS: NORMAL
BH BB PRODUCT CODE: NORMAL
BH BB PRODUCT CODE: NORMAL
BH BB UNIT NUMBER: NORMAL
BH BB UNIT NUMBER: NORMAL
CROSSMATCH INTERPRETATION: NORMAL
CROSSMATCH INTERPRETATION: NORMAL
DEPRECATED RDW RBC AUTO: 42.6 FL (ref 37–54)
ERYTHROCYTE [DISTWIDTH] IN BLOOD BY AUTOMATED COUNT: 13.5 % (ref 12.3–15.4)
HCT VFR BLD AUTO: 28 % (ref 34–46.6)
HGB BLD-MCNC: 9.4 G/DL (ref 12–15.9)
MCH RBC QN AUTO: 29 PG (ref 26.6–33)
MCHC RBC AUTO-ENTMCNC: 33.6 G/DL (ref 31.5–35.7)
MCV RBC AUTO: 86.4 FL (ref 79–97)
PLATELET # BLD AUTO: 120 10*3/MM3 (ref 140–450)
PMV BLD AUTO: 10.6 FL (ref 6–12)
RBC # BLD AUTO: 3.24 10*6/MM3 (ref 3.77–5.28)
UNIT  ABO: NORMAL
UNIT  ABO: NORMAL
UNIT  RH: NORMAL
UNIT  RH: NORMAL
WBC # BLD AUTO: 6.07 10*3/MM3 (ref 3.4–10.8)

## 2021-07-05 PROCEDURE — 99024 POSTOP FOLLOW-UP VISIT: CPT | Performed by: OBSTETRICS & GYNECOLOGY

## 2021-07-05 PROCEDURE — 85027 COMPLETE CBC AUTOMATED: CPT | Performed by: OBSTETRICS & GYNECOLOGY

## 2021-07-05 RX ORDER — IBUPROFEN 600 MG/1
600 TABLET ORAL EVERY 6 HOURS PRN
Qty: 60 TABLET | Refills: 1 | Status: SHIPPED | OUTPATIENT
Start: 2021-07-05 | End: 2021-11-04

## 2021-07-05 RX ORDER — FERROUS SULFATE 325(65) MG
325 TABLET ORAL
Qty: 30 TABLET | Refills: 2 | Status: SHIPPED | OUTPATIENT
Start: 2021-07-05 | End: 2021-10-03

## 2021-07-05 RX ORDER — OXYCODONE HYDROCHLORIDE 5 MG/1
5 TABLET ORAL EVERY 6 HOURS PRN
Qty: 15 TABLET | Refills: 0 | Status: SHIPPED | OUTPATIENT
Start: 2021-07-05 | End: 2021-07-09

## 2021-07-05 RX ORDER — PSEUDOEPHEDRINE HCL 30 MG
100 TABLET ORAL 2 TIMES DAILY PRN
Qty: 60 CAPSULE | Refills: 1 | Status: SHIPPED | OUTPATIENT
Start: 2021-07-05 | End: 2021-11-04

## 2021-07-05 RX ADMIN — DOCUSATE SODIUM 100 MG: 100 CAPSULE, LIQUID FILLED ORAL at 09:28

## 2021-07-05 RX ADMIN — OXYCODONE 5 MG: 5 TABLET ORAL at 12:41

## 2021-07-05 RX ADMIN — IBUPROFEN 600 MG: 600 TABLET, FILM COATED ORAL at 09:28

## 2021-07-05 RX ADMIN — OXYCODONE 5 MG: 5 TABLET ORAL at 09:28

## 2021-07-05 NOTE — PLAN OF CARE
Problem: Adult Inpatient Plan of Care  Goal: Plan of Care Review  Flowsheets  Taken 7/5/2021 0644 by Jasmin Juarez, RN  Progress: improving  Outcome Summary: VSS. Adequate I&O. Visit from a couple friends.  at bedside. Oxy IR x1. Ibuprofen x1. Minimal bleeding. Hbg improved this am.

## 2021-07-05 NOTE — DISCHARGE SUMMARY
Constantino   Bernadette Munroe  : 1990  MRN: [id number]  CSN: [id number]    Discharge Summary      Date of Admission: 2021   Date of Discharge: 2021   Discharge Diagnoses: 1. Post op s/p diagnostic laparoscopy with conversion to mini laparotomy with right cornual/interstitial ectopic wedge resection and right salpingectomy   2. Acute blood loss anemia    Procedures Performed: Procedure(s):  DIAGNOSTIC LAPAROSCOPY  Right cornual ectopic wedge resection with mini laparotomy      Consults: None   Brief History: Patient is a 31 y.o.  who presented to the ER this past Friday with a positive pregnancy test and reports of extreme abdominal pain.  She was diagnosed with a ruptured ectopic pregnancy.  Patient had the above listed procedure.   Hospital Course:  Patient's postoperative course was complicated by acute blood loss anemia which she received 2 units of blood for and had appropriate rise in her hemoglobin.  On postop day 3 she was a meeting appropriate postoperative milestones is ready to be discharged home with close follow-up with plan for following hCG level to 0.  Contraceptive and intercourse precautions were discussed with patient and partner in detail   Pending Studies: Pending tests: none   Condition at discharge: gradually improving   Discharge Medications:    Your medication list      START taking these medications      Instructions Last Dose Given Next Dose Due   docusate sodium 100 MG capsule      Take 1 capsule by mouth 2 (Two) Times a Day As Needed for Constipation.       ferrous sulfate 325 (65 FE) MG tablet      Take 1 tablet by mouth Daily With Breakfast for 90 days.       ibuprofen 600 MG tablet  Commonly known as: ADVIL,MOTRIN      Take 1 tablet by mouth Every 6 (Six) Hours As Needed for Mild Pain  or Moderate Pain .       oxyCODONE 5 MG immediate release tablet  Commonly known as: ROXICODONE      Take 1 tablet by mouth Every 6 (Six) Hours As Needed for Moderate Pain  for  up to 4 days.             Where to Get Your Medications      These medications were sent to [facility]  [address] [place] [address] [address], [place] [address]    Hours: 7:00 AM-5:30 PM M-F, 8:00 AM-4:30 PM Sat-Sun Phone: [phone] ·   docusate sodium 100 MG capsule  · ferrous sulfate 325 (65 FE) MG tablet  · ibuprofen 600 MG tablet  · oxyCODONE 5 MG immediate release tablet        Discharge Disposition: home   Follow-up: This week with Dr. Read         This note has been electronically signed.    Stella Read MD  July 5, 2021

## 2021-07-05 NOTE — PROGRESS NOTES
BNENY Meeks  Bernadette Munroe  : 1990  MRN: [id number]  CSN: [id number]    Post-operative Day #3  CC: routine post operative follow up   Subjective   Her pain is well controlled. She is passing gas. Her bowels are working normally. She is ambulating. She is ready to go home. She received 2 units of pRBCs yesterday      Objective     Min/max vitals past 24 hours:   Temp  Min: 97.9 °F (36.6 °C)  Max: 98.9 °F (37.2 °C)  BP  Min: 93/57  Max: 115/72  Pulse  Min: 61  Max: 100  Resp  Min: 16  Max: 18        I/O last 3 completed shifts:  In: 898.8 [P.O.:100; Blood:798.8]  Out: 4675 [Urine:4675]    General: well developed; well nourished  no acute distress   Abdomen: soft, non-tender; no masses  no umbilical or inguinal hernias are present  no hepato-splenomegaly  incision is clean, dry, intact and without drainage   Pelvic: Not performed   Ext: Calves NT     Last 3 values   Results from last 7 days   Lab Units /  0455 07  0724 0703/  1211   WBC 10*3/mm3 6.07 5.25 11.70*   HEMOGLOBIN g/dL 9.4* 6.6* 7.2*   HEMATOCRIT % 28.0* 19.6* 21.4*   PLATELETS 10*3/mm3 120* 123* 154          Assessment   1. Post-op Day #3 S/P diagnostic laparoscopy with conversion to mini laparotomy with right cornual/interstitial ectopic wedge resection and right salpingectomy   1. Acute blood loss anemia - s/p 2 units or pRBCs with appropriate rise from 7.5 yo 9.4. Asymptomatic.      Plan   1. Discharge to home  2. D/C questions all answered  3. Follow-up appointment in 1 week(s)   4. Reviewed importance of trending HCG level 0  5. Reviewed no intercourse until post operative visit  6. Reviewed importance of no attempting pregnancy for one year after this surgery and importance of contraception. Her and  verbalized understanding and all questions were answered to the best of my ability.   7. Explained the need for oral iron for 2-3 months.     Stella Read MD  2021  11:23 EDT

## 2021-07-06 ENCOUNTER — LAB (OUTPATIENT)
Dept: LAB | Facility: HOSPITAL | Age: 31
End: 2021-07-06

## 2021-07-06 DIAGNOSIS — O00.80 PREGNANCY, ECTOPIC, CORNUAL OR CERVICAL: ICD-10-CM

## 2021-07-06 LAB — HCG INTACT+B SERPL-ACNC: 489.4 MIU/ML

## 2021-07-06 PROCEDURE — 84702 CHORIONIC GONADOTROPIN TEST: CPT

## 2021-07-06 NOTE — PAYOR COMM NOTE
"Bernadette Whitmore (31 y.o. Female)     Date of Birth Social Security Number Address Home Phone MRN    1990  [address] [id number] [phone] [id number]    Latter day Marital Status          [employer]        Admission Date Admission Type Admitting Provider Attending Provider Department, Room/Bed    21 Emergency Stella Read MD  [facility] 5B, N542/1    Discharge Date Discharge Disposition Discharge Destination        2021 Home or Self Care              Attending Provider: (none)   Allergies: Sulfa Antibiotics    Isolation: None   Infection: None   Code Status: Prior    Ht: 157.5 cm (62\")   Wt: 50.8 kg (112 lb)    Admission Cmt: None   Principal Problem: None                Active Insurance as of 2021     Primary Coverage     Payor Plan Insurance Group Employer/Plan Group    MISC COMMERCIAL MISC COMMERCIAL [id number]     Coverage Address Coverage Phone Number Coverage Fax Number Effective Dates    [address] [phone]  2021 - None Entered    [facility] [address]       Subscriber Name Subscriber Birth Date Member ID       EMILY WHITMORE 1971 [id number]                 Emergency Contacts      (Rel.) Home Phone Work Phone Mobile Phone    Emily Whitmore (Spouse) -- -- [phone]            Insurance Information                MISC COMMERCIAL[employer] Phone: [phone]    Subscriber: Emily Whitmore Subscriber#: [id number]    Group#: [id number] Precert#: [id number]             History & Physical      Stella Read MD at 21 0034           [place]  Bernadette Whitmore  :   1990  MRN:   [id number]  CSN:    [id number]     Consult Requested By: Dr. Matias   Consulting Service: Gynecology   Reason for Consultation: Ruptured ectopic pregnancy   Date of consultation: 2021            Subjective      Bernadette Whitmore is a 31 y.o. year old  who presented to the ER earlier this evening with severe lower " right quadrant pain.  She reports severe onset of pain on the right side that she thought she was gas but continue to progress around 10 AM this morning.  She is currently about 6 weeks pregnant based on LMP on May 18th of this year.  Patient with severe abdominal pain in the ER and tachycardic to 120s to 160s.     Medical History   History reviewed. No pertinent past medical history.     Surgical History         Past Surgical History:   Procedure Laterality Date   • THROAT SURGERY         POLYPS ON VOCAL CORDS    • TONSILLECTOMY                               OB History    Para Term  AB Living   1 0 0 0 0 0   SAB TAB Ectopic Molar Multiple Live Births    0 0 0 0 0 0       # Outcome Date GA Lbr Deonte/2nd Weight Sex Delivery Anes PTL Lv   1 Current                        Social History    Tobacco Use      Smoking status: Former Smoker        Packs/day: 0.00        Current Facility-Administered Medications:   •  [MAR Hold] Sodium Chloride (PF) 0.9 % 10 mL, 10 mL, Intravenous, PRN, Jaylan Matias MD  •  sodium chloride 0.9 % bolus 500 mL, 500 mL, Intravenous, Once, Jaylan Matias MD, Last Rate: 1,000 mL/hr at 210, 500 mL at 21  •  sodium chloride 0.9 % infusion, 125 mL/hr, Intravenous, Continuous, Jaylan Matias MD           Allergies   Allergen Reactions   • Sulfa Antibiotics Unknown - Low Severity       UNKNOWN-MOTHER TOLD ME         Review of Systems   Constitutional: Negative for chills and fever.   HENT: Negative for congestion and sore throat.    Respiratory: Negative for shortness of breath and wheezing.    Cardiovascular: Negative for chest pain and palpitations.   Gastrointestinal: Positive for abdominal distention, abdominal pain and nausea. Negative for vomiting.   Genitourinary: Negative for frequency, vaginal bleeding and vaginal pain.   Musculoskeletal: Negative for back pain and myalgias.   Neurological: Negative for dizziness, light-headedness and headaches.  "  Psychiatric/Behavioral: Negative for confusion. The patient is not nervous/anxious.                Objective      /66 (BP Location: Right arm, Patient Position: Lying)   Pulse 97   Temp 98.6 °F (37 °C) (Temporal)   Resp 18   Ht 157.5 cm (62\")   Wt 50.8 kg (112 lb)   LMP 05/18/2021   SpO2 100%   BMI 20.49 kg/m²   General: well developed; well nourished  shaky   Heart: Not performed.   Lungs: breathing is unlabored   Abdomen: no umbilical or inguinal hernias are present  no hepato-splenomegaly  TTP throughout with some mild guarding    Pelvis:: Not performed.   Labs  CBC:         Lab Results   Component Value Date      07/02/2021     HGB 11.6 (L) 07/02/2021     HCT 33.5 (L) 07/02/2021     WBC 14.16 (H) 07/02/2021      CBC w/ diff:         Lab Results   Component Value Date      07/02/2021     HGB 11.6 (L) 07/02/2021     HCT 33.5 (L) 07/02/2021     MCV 85.2 07/02/2021     RDW 12.4 07/02/2021     WBC 14.16 (H) 07/02/2021     NEUTRORELPCT 83.2 (H) 07/02/2021     AUTOIGPER 0.4 07/02/2021     LYMPHORELPCT 12.1 (L) 07/02/2021     MONORELPCT 4.0 (L) 07/02/2021     EOSRELPCT 0.0 (L) 07/02/2021     BASORELPCT 0.3 07/02/2021      CMP:         Lab Results   Component Value Date      (L) 07/02/2021     K 4.1 07/02/2021      07/02/2021     CO2 20.0 (L) 07/02/2021     BUN 8 07/02/2021     CREATININE 0.68 07/02/2021     GLUCOSE 116 (H) 07/02/2021     ALBUMIN 4.50 07/02/2021     CALCIUM 10.3 07/02/2021     AST 14 07/02/2021     ALT 10 07/02/2021     BILITOT 0.5 07/02/2021      HCG:         Lab Results   Component Value Date     HCGQUANT 2,355.00 07/02/2021         Imaging Reviewed  Concern for ruptured ectopic based on free fluid seen in the cul-de-sac and surrounding uterus with adnexal structure concerning for right ectopic pregnancy              Assessment      1. Ruptured ectopic pregnancy           Plan      1. Plan to proceed with diagnostic laparoscopy with most likely right " salpingectomy.  Discussed plan of care with patient in detail  2. Today I discussed with Bernadette the risks of her upcoming surgical procedure. Risks including intraoperative bleeding, infection at the site of surgery, damage to the adjacent surrounding organs, catheter induced urinary tract infections and the small risk for deep vein thrombosis were all explained. Additionally, the small risk for reoperation in the event of unanticipated bleeding or surgical injury was discussed.  All of her questions were answered fully.  She left with a very clear understanding of the preoperative surgical indications and the nature of the surgery for which she is scheduled.  She understands to be NPO after midnight and to be at the preoperative area ~ 1-1/2 hours prior to the scheduled surgical start time.     My findings from today's consultation along with recommendations and plan of care have been discussed with Dr. Matias and patient in detail.      Stella Read MD  7/2/2021  19:19 EDT         Electronically signed by Stella Read MD at 07/03/21 0037       Lab Results (last 72 hours)     Procedure Component Value Units Date/Time    CBC (No Diff) [[id number]]  (Abnormal) Collected: 07/05/21 0455    Specimen: Blood Updated: 07/05/21 0531     WBC 6.07 10*3/mm3      RBC 3.24 10*6/mm3      Hemoglobin 9.4 g/dL      Hematocrit 28.0 %      MCV 86.4 fL      MCH 29.0 pg      MCHC 33.6 g/dL      RDW 13.5 %      RDW-SD 42.6 fl      MPV 10.6 fL      Platelets 120 10*3/mm3     CBC (No Diff) [[id number]]  (Abnormal) Collected: 07/04/21 0724    Specimen: Blood Updated: 07/04/21 0813     WBC 5.25 10*3/mm3      RBC 2.21 10*6/mm3      Hemoglobin 6.6 g/dL      Hematocrit 19.6 %      MCV 88.7 fL      MCH 29.9 pg      MCHC 33.7 g/dL      RDW 13.0 %      RDW-SD 42.1 fl      MPV 10.9 fL      Platelets 123 10*3/mm3     CBC (No Diff) [[id number]]  (Abnormal) Collected: 07/03/21 1211    Specimen: Blood Updated: 07/03/21 1235     WBC 11.70  10*3/mm3      RBC 2.42 10*6/mm3      Hemoglobin 7.2 g/dL      Hematocrit 21.4 %      MCV 88.4 fL      MCH 29.8 pg      MCHC 33.6 g/dL      RDW 12.6 %      RDW-SD 40.4 fl      MPV 10.5 fL      Platelets 154 10*3/mm3     hCG, Quantitative, Pregnancy [[id number]] Collected: 07/03/21 0744    Specimen: Blood Updated: 07/03/21 0915     HCG Quantitative 1,463.00 mIU/mL     Narrative:      HCG Ranges by Gestational Age    Females - non-pregnant premenopausal   </= 1mIU/mL HCG  Females - postmenopausal               </= 7mIU/mL HCG    3 Weeks         5.8 -    71.2 mIU/mL  4 Weeks         9.5 -     750 mIU/mL  5 Weeks         217 -   7,138 mIU/mL  6 Weeks         158 -  31,795 mIU/mL  7 Weeks       3,697 - 163,563 mIU/mL  8 Weeks      32,065 - 149,571 mIU/mL  9 Weeks      63,803 - 151,410 mIU/mL  10 Weeks     46,509 - 186,977 mIU/mL  12 Weeks     27,832 - 210,612 mIU/mL  14 Weeks     13,950 -  62,530 mIU/mL  15 Weeks     12,039 -  70,971 mIU/mL  16 Weeks      9,040 -  56,451 mIU/mL  17 Weeks      8,175 -  55,868 mIU/mL  18 Weeks      8,099 -  58,176 mIU/mL  Results may be falsely decreased if patient taking Biotin.      CBC (No Diff) [[id number]]  (Abnormal) Collected: 07/03/21 0744    Specimen: Blood Updated: 07/03/21 0855     WBC 14.80 10*3/mm3      RBC 2.51 10*6/mm3      Hemoglobin 7.3 g/dL      Hematocrit 21.9 %      MCV 87.3 fL      MCH 29.1 pg      MCHC 33.3 g/dL      RDW 12.6 %      RDW-SD 40.3 fl      MPV 11.1 fL      Platelets 173 10*3/mm3     Narrative:      Verified by repeat analysis.      Tissue Pathology Exam [[id number]] Collected: 07/02/21 2041    Specimen: Tissue from Fallopian Tube, Right; Tissue from Fallopian Tube, Right Updated: 07/03/21 0447    [place] Draw [[id number]] Collected: 07/02/21 1733    Specimen: Blood Updated: 07/02/21 2149    Narrative:      The following orders were created for panel order [place] Draw.  Procedure                               Abnormality         Status                      ---------                               -----------         ------                     Green Top (Gel)[[id number]]                                  Final result               Lavender Top[[id number]]                                     Final result               Gold Top - SST[[id number]]                                   Final result               Bains Top[[id number]]                                         Final result                 Please view results for these tests on the individual orders.    Bains Top [[id number]] Collected: 07/02/21 1733    Specimen: Blood Updated: 07/02/21 2145     Extra Tube Hold for add-ons.     Comment: Auto resulted.       COVID PRE-OP / PRE-PROCEDURE SCREENING ORDER (NO ISOLATION) - Swab, Nasopharynx [[id number]]  (Normal) Collected: 07/02/21 1841    Specimen: Swab from Nasopharynx Updated: 07/02/21 1920    Narrative:      The following orders were created for panel order COVID PRE-OP / PRE-PROCEDURE SCREENING ORDER (NO ISOLATION) - Swab, Nasopharynx.  Procedure                               Abnormality         Status                     ---------                               -----------         ------                     COVID-19, ABBOTT IN-HOUS...[[id number]]  Normal              Final result                 Please view results for these tests on the individual orders.    COVID-19, ABBOTT IN-HOUSE,NASAL Swab (NO TRANSPORT MEDIA) 2 HR TAT - Swab, Nasopharynx [[id number]]  (Normal) Collected: 07/02/21 1841    Specimen: Swab from Nasopharynx Updated: 07/02/21 1920     COVID19 Presumptive Negative    Narrative:      Fact sheet for providers: https://www.fda.gov/media/138698/download     Fact sheet for patients: https://www.fda.gov/media/968332/download    Test performed by PCR.  If inconsistent with clinical signs and symptoms patient should be tested with different authorized molecular test.    hCG, Quantitative, Pregnancy [[id number]] Collected: 07/02/21 1733    Specimen: Blood Updated:  07/02/21 1915     HCG Quantitative 2,355.00 mIU/mL     Narrative:      HCG Ranges by Gestational Age    Females - non-pregnant premenopausal   </= 1mIU/mL HCG  Females - postmenopausal               </= 7mIU/mL HCG    3 Weeks         5.8 -    71.2 mIU/mL  4 Weeks         9.5 -     750 mIU/mL  5 Weeks         217 -   7,138 mIU/mL  6 Weeks         158 -  31,795 mIU/mL  7 Weeks       3,697 - 163,563 mIU/mL  8 Weeks      32,065 - 149,571 mIU/mL  9 Weeks      63,803 - 151,410 mIU/mL  10 Weeks     46,509 - 186,977 mIU/mL  12 Weeks     27,832 - 210,612 mIU/mL  14 Weeks     13,950 -  62,530 mIU/mL  15 Weeks     12,039 -  70,971 mIU/mL  16 Weeks      9,040 -  56,451 mIU/mL  17 Weeks      8,175 -  55,868 mIU/mL  18 Weeks      8,099 -  58,176 mIU/mL  Results may be falsely decreased if patient taking Biotin.      Urinalysis, Microscopic Only - Urine, Clean Catch [[id number]]  (Abnormal) Collected: 07/02/21 1820    Specimen: Urine, Clean Catch Updated: 07/02/21 1908     RBC, UA 0-2 /HPF      WBC, UA Too Numerous to Count /HPF      Bacteria, UA 2+ /HPF      Squamous Epithelial Cells, UA 21-30 /HPF      Hyaline Casts, UA 13-20 /LPF      Mucus, UA Large/3+ /HPF      Methodology Manual Light Microscopy    Urinalysis With Microscopic If Indicated (No Culture) - Urine, Clean Catch [[id number]]  (Abnormal) Collected: 07/02/21 1820    Specimen: Urine, Clean Catch Updated: 07/02/21 1906     Color, UA Yellow     Appearance, UA Turbid     pH, UA 5.5     Specific Gravity, UA 1.029     Glucose, UA Negative     Ketones, UA 15 mg/dL (1+)     Bilirubin, UA Negative     Blood, UA Trace     Protein, UA Trace     Leuk Esterase, UA Moderate (2+)     Nitrite, UA Negative     Urobilinogen, UA 0.2 E.U./dL    Green Top (Gel) [[id number]] Collected: 07/02/21 1733    Specimen: Blood Updated: 07/02/21 1846     Extra Tube Hold for add-ons.     Comment: Auto resulted.       [facility] [[id number]] Collected: 07/02/21 1733    Specimen: Blood Updated:  07/02/21 1846     Extra Tube Hold for add-ons.     Comment: Auto resulted.       Jessica Top [[id number]] Collected: 07/02/21 1733    Specimen: Blood Updated: 07/02/21 1846     Extra Tube hold for add-on     Comment: Auto resulted       Comprehensive Metabolic Panel [[id number]]  (Abnormal) Collected: 07/02/21 1733    Specimen: Blood Updated: 07/02/21 1816     Glucose 116 mg/dL      BUN 8 mg/dL      Creatinine 0.68 mg/dL      Sodium 132 mmol/L      Potassium 4.1 mmol/L      Chloride 101 mmol/L      CO2 20.0 mmol/L      Calcium 10.3 mg/dL      Total Protein 6.7 g/dL      Albumin 4.50 g/dL      ALT (SGPT) 10 U/L      AST (SGOT) 14 U/L      Alkaline Phosphatase 49 U/L      Total Bilirubin 0.5 mg/dL      eGFR Non African Amer 102 mL/min/1.73      Globulin 2.2 gm/dL      A/G Ratio 2.0 g/dL      BUN/Creatinine Ratio 11.8     Anion Gap 11.0 mmol/L     Narrative:      GFR Normal >60  Chronic Kidney Disease <60  Kidney Failure <15      Lipase [[id number]]  (Normal) Collected: 07/02/21 1733    Specimen: Blood Updated: 07/02/21 1816     Lipase 16 U/L     CBC & Differential [[id number]]  (Abnormal) Collected: 07/02/21 1733    Specimen: Blood Updated: 07/02/21 1742    Narrative:      The following orders were created for panel order CBC & Differential.  Procedure                               Abnormality         Status                     ---------                               -----------         ------                     CBC Auto Differential[[id number]]        Abnormal            Final result                 Please view results for these tests on the individual orders.    CBC Auto Differential [[id number]]  (Abnormal) Collected: 07/02/21 1733    Specimen: Blood Updated: 07/02/21 1742     WBC 14.16 10*3/mm3      RBC 3.93 10*6/mm3      Hemoglobin 11.6 g/dL      Hematocrit 33.5 %      MCV 85.2 fL      MCH 29.5 pg      MCHC 34.6 g/dL      RDW 12.4 %      RDW-SD 38.6 fl      MPV 10.1 fL      Platelets 237 10*3/mm3      Neutrophil %  83.2 %      Lymphocyte % 12.1 %      Monocyte % 4.0 %      Eosinophil % 0.0 %      Basophil % 0.3 %      Immature Grans % 0.4 %      Neutrophils, Absolute 11.78 10*3/mm3      Lymphocytes, Absolute 1.72 10*3/mm3      Monocytes, Absolute 0.57 10*3/mm3      Eosinophils, Absolute 0.00 10*3/mm3      Basophils, Absolute 0.04 10*3/mm3      Immature Grans, Absolute 0.05 10*3/mm3      nRBC 0.0 /100 WBC           Imaging Results (Last 72 Hours)     Procedure Component Value Units Date/Time    US Ob Transvaginal [[id number]] Collected: 07/02/21 1820     Updated: 07/05/21 1242    Narrative:      EXAMINATION: US OB TRANSVAGINAL - 07/02/2021     INDICATION: Extreme pain with pregnancy.     TECHNIQUE: Transvaginal sonographic imaging is obtained of the pelvis in  the sagittal and transverse planes.     COMPARISON: None.     FINDINGS: Examination is suboptimal due to patient's inability to  tolerate the exam due to severe pain. The uterus measures 7.4 x 4.0 x  5.6 cm. The individual stripe measures 1.4 cm. There is no visualized  intrauterine pregnancy. The myometrium is unremarkable. There is a large  amount of complex free fluid identified within the pelvis. The right  ovary measures 3.4 x 2.0 x 2.7 cm. There is heterogeneous debris  throughout the right adnexa. Findings are most concerning for an  ruptured ectopic pregnancy likely originating in the right adnexa. The  left ovary is unable to be visualized with patient not able to tolerate  further imaging of the left adnexa due to pain. There is heterogeneous  debris seen within the left adnexa.        Impression:      Large amount of complex fluid seen throughout the pelvis  with no visualized intrauterine pregnancy. Findings are highly  concerning for a ruptured ectopic pregnancy likely originating in the  right adnexa with elevated  beta-hCG levels. Clinical correlation is  needed.     DICTATED:   07/02/2021  EDITED/ls :   07/03/2021         This report was finalized on  2021 12:38 PM by Dr. Betty Garcia MD.           Operative/Procedure Notes (last 72 hours) (Notes from 21 1400 through 21 1400)    No notes of this type exist for this encounter.            Physician Progress Notes (last 72 hours) (Notes from 21 1400 through 21 1400)      Stella Read MD at 21 1123           Constantino Munroe  : 1990  MRN: [id number]  CSN: [id number]    Post-operative Day #3  CC: routine post operative follow up   Subjective   Her pain is well controlled. She is passing gas. Her bowels are working normally. She is ambulating. She is ready to go home. She received 2 units of pRBCs yesterday     Objective     Min/max vitals past 24 hours:   Temp  Min: 97.9 °F (36.6 °C)  Max: 98.9 °F (37.2 °C)  BP  Min: 93/57  Max: 115/72  Pulse  Min: 61  Max: 100  Resp  Min: 16  Max: 18        I/O last 3 completed shifts:  In: 898.8 [P.O.:100; Blood:798.8]  Out: 4675 [Urine:4675]    General: well developed; well nourished  no acute distress   Abdomen: soft, non-tender; no masses  no umbilical or inguinal hernias are present  no hepato-splenomegaly  incision is clean, dry, intact and without drainage   Pelvic: Not performed   Ext: Calves NT     Last 3 values   Results from last 7 days   Lab Units 21  0455 21  0724 21  1211   WBC 10*3/mm3 6.07 5.25 11.70*   HEMOGLOBIN g/dL 9.4* 6.6* 7.2*   HEMATOCRIT % 28.0* 19.6* 21.4*   PLATELETS 10*3/mm3 120* 123* 154         Assessment   1. Post-op Day #3 S/P diagnostic laparoscopy with conversion to mini laparotomy with right cornual/interstitial ectopic wedge resection and right salpingectomy   1. Acute blood loss anemia - s/p 2 units or pRBCs with appropriate rise from 6.5 yo 9.4. Asymptomatic.     Plan   1. Discharge to home  2. D/C questions all answered  3. Follow-up appointment in 1 week(s)   4. Reviewed importance of trending HCG level 0  5. Reviewed no intercourse until post operative  visit  6. Reviewed importance of no attempting pregnancy for one year after this surgery and importance of contraception. Her and  verbalized understanding and all questions were answered to the best of my ability.   7. Explained the need for oral iron for 2-3 months.     Stella Read MD  2021  11:23 EDT            Electronically signed by Stella Read MD at 21 1125     Stella Read MD at 21 1154           Constantino Munroe  : 1990  MRN: [id number]  CSN: [id number]    Post-operative Day #2  Subjective   Her pain is well controlled. She is passing gas. Her bowels are working normally. She is ambulating. Tolerating regular diet. A little emotional this am.     Objective     Min/max vitals past 24 hours:   Temp  Min: 98 °F (36.7 °C)  Max: 99 °F (37.2 °C)  BP  Min: 99/58  Max: 123/57  Pulse  Min: 85  Max: 116  Resp  Min: 16  Max: 18        I/O last 3 completed shifts:  In: 2020 [P.O.:820; I.V.:1200]  Out: 6725 [Urine:5925; Blood:800]    General: well developed; well nourished  no acute distress   Abdomen: soft, non-tender; no masses  no umbilical or inguinal hernias are present  no hepato-splenomegaly  incision is clean, dry, intact and without drainage   Pelvic: Not performed   Ext: Calves NT     Last 3 values   Results from last 7 days   Lab Units 21  0724 21  1211 21  0744   WBC 10*3/mm3 5.25 11.70* 14.80*   HEMOGLOBIN g/dL 6.6* 7.2* 7.3*   HEMATOCRIT % 19.6* 21.4* 21.9*   PLATELETS 10*3/mm3 123* 154 173         Assessment   1. Post-op Day #2 S/P diagnostic laparoscopy with conversion to mini laparotomy with right cornual/interstitial ectopic wedge resection and right salpingectomy   2. Acute blood loss anemia - repeat Hgb 6.6 this morning, asymptomatic, no concern for active bleeding    Plan   8. Continue routine post-operative care   9. Given Hgb <7 with large hemoperitoneum at time of surgery will transfuse 2 units pRBCs. Discussed  r/b/a in detail with patient and she provided consent.   10. Repeat CBC in am and if appropriate rise and patient continuing to meet post operative milestones will plan for discharge to home with close outpatient follow up.     Stella Read MD  7/4/2021  11:54 EDT            Electronically signed by Stella Read MD at 07/04/21 1156     Stella Read MD at 07/04/21 0843        Notified of Hgb 6.6  Will plan to tranfuse 2 units pRBC  Stella Read MD      Electronically signed by Stella Read MD at 07/04/21 0844       Consult Notes (last 72 hours) (Notes from 07/03/21 1400 through 07/06/21 1400)    No notes of this type exist for this encounter.

## 2021-07-07 ENCOUNTER — OFFICE VISIT (OUTPATIENT)
Dept: OBSTETRICS AND GYNECOLOGY | Facility: CLINIC | Age: 31
End: 2021-07-07

## 2021-07-07 VITALS
WEIGHT: 115.4 LBS | BODY MASS INDEX: 21.23 KG/M2 | DIASTOLIC BLOOD PRESSURE: 74 MMHG | SYSTOLIC BLOOD PRESSURE: 122 MMHG | HEIGHT: 62 IN

## 2021-07-07 DIAGNOSIS — Z30.09 GENERAL COUNSELING AND ADVICE FOR CONTRACEPTIVE MANAGEMENT: ICD-10-CM

## 2021-07-07 DIAGNOSIS — K59.03 DRUG-INDUCED CONSTIPATION: ICD-10-CM

## 2021-07-07 DIAGNOSIS — O00.80 PREGNANCY, ECTOPIC, CORNUAL OR CERVICAL: ICD-10-CM

## 2021-07-07 DIAGNOSIS — Z98.890 POST-OPERATIVE STATE: Primary | ICD-10-CM

## 2021-07-07 PROCEDURE — 99024 POSTOP FOLLOW-UP VISIT: CPT | Performed by: OBSTETRICS & GYNECOLOGY

## 2021-07-07 NOTE — PROGRESS NOTES
"Subjective   Chief Complaint   Patient presents with   • Establish Care     New pt.   • Post-op     5d postop- ruptured ectopic. pt states that she has still not had a bowel movement so she has not started taking the iron     Bernadette KEYA Munroe is a 31 y.o. year old .  Patient's last menstrual period was 2021.  She presents to be seen because of post operative follow up POD#5 s/p diagnostic laparoscopy converted to mini laparotomy with cornual wedge resection of ectopic pregnancy and right salpingectomy for ruptured cornual/interstitial ectopic pregnancy (Right). She reports she is tolerating a regular diet, voiding spontaneously, and pain is well controlled mostly with ibuprofen with intermittent narcotic use. Has not had a bm yet. She is passing flatus.   Pathology results from surgery are still pending.    OTHER THINGS SHE WANTS TO DISCUSS TODAY:  Nothing else    The following portions of the patient's history were reviewed and updated as appropriate:current medications, allergies, past family history, past medical history, past social history and past surgical history    Social History    Tobacco Use      Smoking status: Current Some Day Smoker        Packs/day: 0.00      Smokeless tobacco: Never Used      Tobacco comment: vapes    Review of Systems  Constitutional POS: nothing reported    NEG: anorexia or night sweats   Genitourinary POS: nothing reported    NEG: dysuria or hematuria   Gastointestinal POS: constipation (new onset)    NEG: bloating, change in bowel habits, melena or reflux symptoms   Integument POS: nothing reported    NEG: moles that are changing in size, shape, color or rashes   Breast POS: nothing reported    NEG: persistent breast lump, skin dimpling or nipple discharge         Objective   /74   Ht 157.5 cm (62\")   Wt 52.3 kg (115 lb 6.4 oz)   LMP 2021   Breastfeeding No   BMI 21.11 kg/m²     General:  well developed; well nourished  no acute distress   Skin:  No " suspicious lesions seen   Thyroid: not examined   Lungs:  breathing is unlabored   Heart:  Not performed.   Breasts:  Not performed.   Abdomen: soft, non-tender; no masses  no umbilical or inguinal hernias are present  no hepato-splenomegaly  incision is clean, dry, intact and without drainage   Pelvis: Not performed.     Lab Review   HCG    Imaging   Pelvic ultrasound report        Assessment   1. POD#5 s/p s/p diagnostic laparoscopy converted to mini laparotomy with cornual wedge resection of ectopic pregnancy and right salpingectomy for ruptured cornual/interstitial ectopic pregnancy   2. Post operative constipation   3. Contraceptive counseling     Plan   1. Continue ibuprofen incorporate over-the-counter Tylenol as needed for pain.  Reviewed okay to intermittently use narcotics.  2. Recommend that she  MiraLAX and take this daily to help assist with bowel movements and continue Colace twice a day  3. She knows to repeat her hCG level prior to her follow-up appointment in 2 weeks  4. We discussed not getting pregnant for at least 1 year and she is most likely considering Nexplanon placement as this is worked for her in the past x2.  5. The importance of keeping all planned follow-up and taking all medications as prescribed was emphasized.  6. Follow up in 2 weeks     No orders of the defined types were placed in this encounter.        Orders Placed This Encounter   Procedures   • hCG, Quantitative, Pregnancy     Standing Status:   Future     Standing Expiration Date:   7/7/2022     Order Specific Question:   Release to patient     Answer:   Immediate         This note was electronically signed.    Stella Read MD  July 7, 2021    Note: Speech recognition transcription software may have been used to create portions of this document.  An attempt at proofreading has been made but errors in transcription could still be present.

## 2021-07-08 LAB
CYTO UR: NORMAL
LAB AP CASE REPORT: NORMAL
PATH REPORT.FINAL DX SPEC: NORMAL
PATH REPORT.GROSS SPEC: NORMAL

## 2021-07-12 ENCOUNTER — TELEPHONE (OUTPATIENT)
Dept: LABOR AND DELIVERY | Facility: HOSPITAL | Age: 31
End: 2021-07-12

## 2021-07-12 NOTE — TELEPHONE ENCOUNTER
"Spoke with Bernadette for ~ 35 minutes.  She states she is \"Doing OK, getting better everyday\" regarding her physical health.  States she feels like maybe she has been trying to \"avoid\" some emotions related to loss related to physical discomfort but that she and her  spoke with  and he put them \"at ease and gave them peace during a sad situation\".  We discussed how this is normal in coping with grief along with physical complications. States one of her biggest concerns was talking to 8yo daughter, who was very excited about the baby, but  was also helpful in talking about answering her questions and daughter has done well.  I offered to mail information regarding children and grief, Brenadette states she would appreciate having that information (done after conversation).  States only daughter and  knew about pregnancy but after loss she spoke with her parents and close friends and has \"wonderful support\" which has been very helpful as her  [place] but does feel as though she and her  had a wonderful relationship before loss but have have become even closer now.  Did express some concern regarding future pregnancy as she has been advised to wait at least a year before becoming pregnant again and  is 20 years older and she has concerns regarding his age but he does say he would like to try again if she desires.  I encouraged her to continue honest dialogue with Haile and follow physician recommendations.  States she has deleted some social media in order to not see pregnancy and baby posts at this time and that has been a helpful coping mechanism.  Has been a  for over 10 years but unemployed since May and is now thinking about working from home in order to heal physically and be present for daughter but feels like working would be helpful.  We discussed how a routine and schedule can be helpful after she recovers physically whether that is through " employment or otherwise.  Bernadette states she would like to receive [facility] [employer] mailings and OK for survey to be sent but does not feel as though support group would be helpful at this time.  I mentioned national on-line support information that is on the support group schedule she received with her grief packet.  I encouraged her to contact [facility] office if she would like more information about support group in the future, has questions or would like to talk.

## 2021-07-13 NOTE — PAYOR COMM NOTE
"Chris Whitmore (31 y.o. Female)     Date of Birth Social Security Number Address Home Phone MRN    1990  [address] [id number] [phone] [id number]    [employer] Marital Status          Yazidi        Admission Date Admission Type Admitting Provider Attending Provider Department, Room/Bed    21 Emergency Stella Read MD  [facility] 5B, N542/1    Discharge Date Discharge Disposition Discharge Destination        2021 Home or Self Care              Attending Provider: (none)   Allergies: Sulfa Antibiotics    Isolation: None   Infection: None   Code Status: Prior    Ht: 157.5 cm (62\")   Wt: 50.8 kg (112 lb)    Admission Cmt: None   Principal Problem: None                Active Insurance as of 2021     Primary Coverage     Payor Plan Insurance Group Employer/Plan Group    CIGNA MISC CIGNA [id number]     Coverage Address Coverage Phone Number Coverage Fax Number Effective Dates    [address]   2020 - None Entered    [facility] [id number]       Subscriber Name Subscriber Birth Date Member ID       CHRIS WHITMORE 1990 [id number]           Secondary Coverage     Payor Plan Insurance Group Employer/Plan Group    [employer] C[employer]CYPRESS [id number]     Payor Plan Address Payor Plan Phone Number Payor Plan Fax Number Effective Dates    [address] [phone]  2020 - None Entered    [facility] [address]       Subscriber Name Subscriber Birth Date Member ID       EMILY WHITMORE 1971 [id number]                 Emergency Contacts      (Rel.) Home Phone Work Phone Mobile Phone    Emily Whitmore (Spouse) -- -- [phone]               Discharge Summary      Stella Read MD at 21 1142           [place]   Chris Whitmore  : 1990  MRN: [id number]  CSN: [id number]    Discharge Summary      Date of Admission: 2021   Date of Discharge: 2021   Discharge Diagnoses: 1. Post op s/p diagnostic laparoscopy " with conversion to mini laparotomy with right cornual/interstitial ectopic wedge resection and right salpingectomy   2. Acute blood loss anemia    Procedures Performed: Procedure(s):  DIAGNOSTIC LAPAROSCOPY  Right cornual ectopic wedge resection with mini laparotomy      Consults: None   Brief History: Patient is a 31 y.o.  who presented to the ER this past Friday with a positive pregnancy test and reports of extreme abdominal pain.  She was diagnosed with a ruptured ectopic pregnancy.  Patient had the above listed procedure.   Hospital Course:  Patient's postoperative course was complicated by acute blood loss anemia which she received 2 units of blood for and had appropriate rise in her hemoglobin.  On postop day 3 she was a meeting appropriate postoperative milestones is ready to be discharged home with close follow-up with plan for following hCG level to 0.  Contraceptive and intercourse precautions were discussed with patient and partner in detail   Pending Studies: Pending tests: none   Condition at discharge: gradually improving   Discharge Medications:    Your medication list      START taking these medications      Instructions Last Dose Given Next Dose Due   docusate sodium 100 MG capsule      Take 1 capsule by mouth 2 (Two) Times a Day As Needed for Constipation.       ferrous sulfate 325 (65 FE) MG tablet      Take 1 tablet by mouth Daily With Breakfast for 90 days.       ibuprofen 600 MG tablet  Commonly known as: ADVIL,MOTRIN      Take 1 tablet by mouth Every 6 (Six) Hours As Needed for Mild Pain  or Moderate Pain .       oxyCODONE 5 MG immediate release tablet  Commonly known as: ROXICODONE      Take 1 tablet by mouth Every 6 (Six) Hours As Needed for Moderate Pain  for up to 4 days.             Where to Get Your Medications      These medications were sent to [facility] - [address] [address]    Hours: 7:00 AM-5:30 PM M-F, 8:00 AM-4:30 PM  SatAjaySun Phone: [phone] ·   docusate sodium 100 MG capsule  · ferrous sulfate 325 (65 FE) MG tablet  · ibuprofen 600 MG tablet  · oxyCODONE 5 MG immediate release tablet        Discharge Disposition: home   Follow-up: This week with Dr. Read         This note has been electronically signed.    Stella Read MD  July 5, 2021      Electronically signed by Stella Read MD at 07/05/21 2855

## 2021-07-20 ENCOUNTER — LAB (OUTPATIENT)
Dept: LAB | Facility: HOSPITAL | Age: 31
End: 2021-07-20

## 2021-07-20 DIAGNOSIS — O00.80 PREGNANCY, ECTOPIC, CORNUAL OR CERVICAL: ICD-10-CM

## 2021-07-20 DIAGNOSIS — Z98.890 POST-OPERATIVE STATE: ICD-10-CM

## 2021-07-20 LAB — HCG INTACT+B SERPL-ACNC: 45.85 MIU/ML

## 2021-07-20 PROCEDURE — 84702 CHORIONIC GONADOTROPIN TEST: CPT

## 2021-07-21 ENCOUNTER — OFFICE VISIT (OUTPATIENT)
Dept: OBSTETRICS AND GYNECOLOGY | Facility: CLINIC | Age: 31
End: 2021-07-21

## 2021-07-21 VITALS
RESPIRATION RATE: 14 BRPM | SYSTOLIC BLOOD PRESSURE: 112 MMHG | WEIGHT: 111 LBS | BODY MASS INDEX: 20.3 KG/M2 | DIASTOLIC BLOOD PRESSURE: 66 MMHG

## 2021-07-21 DIAGNOSIS — O00.80 PREGNANCY, ECTOPIC, CORNUAL OR CERVICAL: ICD-10-CM

## 2021-07-21 DIAGNOSIS — Z98.890 POST-OPERATIVE STATE: Primary | ICD-10-CM

## 2021-07-21 PROCEDURE — 99024 POSTOP FOLLOW-UP VISIT: CPT | Performed by: OBSTETRICS & GYNECOLOGY

## 2021-07-21 NOTE — PROGRESS NOTES
Subjective   Chief Complaint   Patient presents with   • Post-op     Bernadette Munroe is a 31 y.o. year old  presenting to be seen for her post-operative visit.  Currently she reports no problems with eating, bowel movements, voiding, or wound drainage and pain is well controlled.    The pathology results from her procedure are in Bernadette's record and are c/w ectopic pregnancy.      OTHER THINGS SHE WANTS TO DISCUSS TODAY:  Nothing else    The following portions of the patient's history were reviewed and updated as appropriate:current medications and allergies       Objective   /66   Resp 14   Wt 50.3 kg (111 lb)   Breastfeeding No   BMI 20.30 kg/m²     General:  well developed; well nourished  no acute distress   Abdomen: soft, non-tender; no masses  no umbilical or inguinal hernias are present  no hepato-splenomegaly  incision is clean, dry, intact and without drainage   Pelvis: Clinical staff was present for exam  External genitalia:  normal appearance of the external genitalia including Bartholin's and [place]'s glands.  :  urethral meatus normal;  Vaginal:  normal pink mucosa without prolapse or lesions.  Cervix:  normal appearance.  Uterus:  normal size, shape and consistency.  Adnexa:  normal bimanual exam of the adnexa.  Rectal:  digital rectal exam not performed; anus visually normal appearing.     HCG 45 yesterday evening        Assessment   1. S/P diagnostic laparoscopy converted to mini laparotomy with cornual wedge resection of ectopic pregnancy and right salpingectomy for ruptured cornual/interstitial ectopic pregnancy   2. Contraceptive counseling     Plan   1. No intercourse until HCG level 0  2. Repeat hCG level in 1 week and then return in 2 weeks for Nexplanon placement.  3. The importance of keeping all planned follow-up and taking all medications as prescribed was emphasized.  4. Follow up in 2 weeks for nexplanon placement.  Reviewed again that this would not take contraceptive  benefit until 2 weeks after placement.    No orders of the defined types were placed in this encounter.        Orders Placed This Encounter   Procedures   • hCG, Quantitative, Pregnancy     Standing Status:   Future     Standing Expiration Date:   7/21/2022     Order Specific Question:   Release to patient     Answer:   Immediate         This note was electronically signed.    Stella Read MD  July 21, 2021    Note: Speech recognition transcription software may have been used to create portions of this document.  An attempt at proofreading has been made but errors in transcription could still be present.

## 2021-07-28 ENCOUNTER — LAB (OUTPATIENT)
Dept: LAB | Facility: HOSPITAL | Age: 31
End: 2021-07-28

## 2021-07-28 DIAGNOSIS — O00.80 PREGNANCY, ECTOPIC, CORNUAL OR CERVICAL: ICD-10-CM

## 2021-07-28 LAB — HCG INTACT+B SERPL-ACNC: 22.05 MIU/ML

## 2021-07-28 PROCEDURE — 84702 CHORIONIC GONADOTROPIN TEST: CPT

## 2021-07-29 ENCOUNTER — TELEPHONE (OUTPATIENT)
Dept: OBSTETRICS AND GYNECOLOGY | Facility: CLINIC | Age: 31
End: 2021-07-29

## 2021-07-29 DIAGNOSIS — O00.80 PREGNANCY, ECTOPIC, CORNUAL OR CERVICAL: Primary | ICD-10-CM

## 2021-08-03 ENCOUNTER — LAB (OUTPATIENT)
Dept: LAB | Facility: HOSPITAL | Age: 31
End: 2021-08-03

## 2021-08-03 DIAGNOSIS — O00.80 PREGNANCY, ECTOPIC, CORNUAL OR CERVICAL: ICD-10-CM

## 2021-08-03 LAB — HCG INTACT+B SERPL-ACNC: 12.96 MIU/ML

## 2021-08-03 PROCEDURE — 84702 CHORIONIC GONADOTROPIN TEST: CPT

## 2021-08-04 ENCOUNTER — OFFICE VISIT (OUTPATIENT)
Dept: OBSTETRICS AND GYNECOLOGY | Facility: CLINIC | Age: 31
End: 2021-08-04

## 2021-08-04 VITALS
SYSTOLIC BLOOD PRESSURE: 100 MMHG | DIASTOLIC BLOOD PRESSURE: 64 MMHG | HEIGHT: 62 IN | BODY MASS INDEX: 20.61 KG/M2 | WEIGHT: 112 LBS

## 2021-08-04 DIAGNOSIS — O00.80 PREGNANCY, ECTOPIC, CORNUAL OR CERVICAL: ICD-10-CM

## 2021-08-04 DIAGNOSIS — Z30.017 INSERTION OF NEXPLANON: Primary | ICD-10-CM

## 2021-08-04 LAB
B-HCG UR QL: NEGATIVE
INTERNAL NEGATIVE CONTROL: NEGATIVE
INTERNAL POSITIVE CONTROL: POSITIVE
Lab: NORMAL

## 2021-08-04 PROCEDURE — 81025 URINE PREGNANCY TEST: CPT | Performed by: OBSTETRICS & GYNECOLOGY

## 2021-08-04 PROCEDURE — 11981 INSERTION DRUG DLVR IMPLANT: CPT | Performed by: OBSTETRICS & GYNECOLOGY

## 2021-08-04 NOTE — PROGRESS NOTES
Nexplanon Insertion    Patient's last menstrual period was 08/04/2021 (exact date).    Date of procedure:  8/4/2021    Risks and benefits discussed? yes  All questions answered? yes  Consents given by the patient  Written consent obtained? yes    Local anesthesia used:  yes - 5 cc's of  Meds; anesthesia local: None, 1% lidocaine    Procedure documentation:    The upper left arm (non-dominant) was marked at the intended site of insertion.  Betadine was used to cleanse the skin.  Local anesthesia was injected.  The Nexplanon was placed subdermally without difficulty.  The devise was able to be palpated in the arm by both myself and Bernadette.  Steri-strips were then placed across the site of insertion and the arm was wrapped.    She tolerated the procedure well.  There were no complications.  EBL was minimal.    Post procedure instructions: Remove the wrapping in 24 hours and the steri-strips in 5 days.    Follow up needed: Repeat HCG in 1-2 weeks. Annual exam in 2-3 months     This note was electronically signed.    Stella Read MD  August 4, 2021

## 2021-11-03 PROBLEM — R10.2 PELVIC PAIN: Status: RESOLVED | Noted: 2021-07-03 | Resolved: 2021-11-03

## 2021-11-03 PROBLEM — Z01.419 WELL WOMAN EXAM: Status: ACTIVE | Noted: 2021-11-03

## 2021-11-03 PROBLEM — O00.80 PREGNANCY, ECTOPIC, CORNUAL OR CERVICAL: Status: RESOLVED | Noted: 2021-07-02 | Resolved: 2021-11-03

## 2021-11-04 ENCOUNTER — OFFICE VISIT (OUTPATIENT)
Dept: OBSTETRICS AND GYNECOLOGY | Facility: CLINIC | Age: 31
End: 2021-11-04

## 2021-11-04 VITALS
DIASTOLIC BLOOD PRESSURE: 78 MMHG | SYSTOLIC BLOOD PRESSURE: 108 MMHG | BODY MASS INDEX: 20.61 KG/M2 | HEIGHT: 62 IN | WEIGHT: 112 LBS

## 2021-11-04 DIAGNOSIS — R61 EXCESSIVE SWEATING: ICD-10-CM

## 2021-11-04 DIAGNOSIS — Z01.419 WELL WOMAN EXAM WITH ROUTINE GYNECOLOGICAL EXAM: Primary | ICD-10-CM

## 2021-11-04 PROCEDURE — 99395 PREV VISIT EST AGE 18-39: CPT | Performed by: OBSTETRICS & GYNECOLOGY

## 2021-11-04 NOTE — PROGRESS NOTES
Subjective   Chief Complaint   Patient presents with   • Gynecologic Exam     annual.      Bernadette Munroe is a 31 y.o. year old  presenting to be seen for her annual exam.     SEXUAL Hx:  She is currently sexually active.  In the past year there there has been NO new sexual partners.    Condoms are never used.  She would not like to be screened for STD's at today's exam.  Current birth control method: Nexplanon.  She is happy with her current method of contraception and does not want to discuss alternative methods of contraception.  MENSTRUAL Hx:  Patient's last menstrual period was 10/02/2021 (exact date).  In the past 6 months her cycles have been regular, predictable and occur monthly.  Her menstrual flow is typically normal.   Each month on average there are roughly 0 day(s) of very heavy flow.    Intermenstrual bleeding is absent.    Post-coital bleeding is absent.  Dysmenorrhea: none  PMS: none  Her cycles are not a source of concern for her that she wishes to discuss today.  HEALTH Hx:  She exercises regularly: no (but is planning to start exercising more ).  She wears her seat belt: yes.  She has concerns about domestic violence: no.  OTHER THINGS SHE WANTS TO DISCUSS TODAY:  Nothing else    The following portions of the patient's history were reviewed and updated as appropriate:problem list, current medications, allergies, past family history, past medical history, past social history and past surgical history.    Social History    Tobacco Use      Smoking status: Current Some Day Smoker        Packs/day: 0.00      Smokeless tobacco: Never Used      Tobacco comment: vapes    Review of Systems  Constitutional POS: sweats    NEG: anorexia or night sweats   Genitourinary POS: nothing reported    NEG: dysuria or hematuria      Gastointestinal POS: nothing reported    NEG: bloating, change in bowel habits, melena or reflux symptoms   Integument POS: nothing reported    NEG: moles that are changing in  "size, shape, color or rashes   Breast POS: nothing reported    NEG: persistent breast lump, skin dimpling or nipple discharge        Objective   /78   Ht 157.5 cm (62\")   Wt 50.8 kg (112 lb)   LMP 10/02/2021 (Exact Date)   Breastfeeding No   BMI 20.49 kg/m²     General:  well developed; well nourished  no acute distress   Skin:  No suspicious lesions seen   Thyroid: normal to inspection and palpation   Breasts:  Examined in supine position  Symmetric without masses or skin dimpling  Nipples normal without inversion, lesions or discharge  There are no palpable axillary nodes   Abdomen: soft, non-tender; no masses  no umbilical or inguinal hernias are present  no hepato-splenomegaly   Pelvis: Clinical staff was present for exam  External genitalia:  normal appearance of the external genitalia including Bartholin's and [place]'s glands.  :  urethral meatus normal;  Vaginal:  normal pink mucosa without prolapse or lesions.  Cervix:  normal appearance. Nabothian cyst(s) present at 1 o'clock;  Uterus:  normal size, shape and consistency.  Adnexa:  normal bimanual exam of the adnexa.  Rectal:  digital rectal exam not performed; anus visually normal appearing.   Nexplanon palpated in LUE     Assessment   1. Normal GYN exam  2. Nexplanon in place  3. Excessive sweating        Plan   Pap and HPV were done today.  If she does not receive the results of the Pap within 2 weeks  time, she was instructed to call to find out the results.  I explained to Bernadette that the recommendations for Pap smear interval in a low risk patient's has lengthened to 5 years time if both cytology and HPV testing were normal.  I encouraged her to be seen yearly for a full physical exam including breast and pelvic exam even during the off years when PAP's will not be performed.  The following tests were ordered today: TSH.  It was explained to Bernadette that all lab test should be back within the one week after they are performed. She will be " notified about the results, regardless of the findings. If she has not been contacted by the office within 2 weeks after the test has been performed, it is her responsibility to contact us to learn about her results.  Reviewed the importance of exercise 2-3 times per week with at least 20-30 minutes of cardio  Reviewed recommendation for HPV vaccine series   No prescription was given or electronically sent at today's visit  The importance of keeping all planned follow-up and taking all medications as prescribed was emphasized.  Follow up for annual exam in one year    No orders of the defined types were placed in this encounter.         This note was electronically signed.    Stella Read MD  November 4, 2021    Note: Speech recognition transcription software may have been used to create portions of this document.  An attempt at proofreading has been made but errors in transcription could still be present.

## 2023-01-25 ENCOUNTER — OFFICE VISIT (OUTPATIENT)
Dept: OBSTETRICS AND GYNECOLOGY | Facility: CLINIC | Age: 33
End: 2023-01-25
Payer: COMMERCIAL

## 2023-01-25 VITALS
HEIGHT: 62 IN | BODY MASS INDEX: 20.61 KG/M2 | WEIGHT: 112 LBS | SYSTOLIC BLOOD PRESSURE: 110 MMHG | DIASTOLIC BLOOD PRESSURE: 70 MMHG

## 2023-01-25 DIAGNOSIS — R61 EXCESSIVE SWEATING: ICD-10-CM

## 2023-01-25 DIAGNOSIS — Z01.419 ENCOUNTER FOR GYNECOLOGICAL EXAMINATION WITHOUT ABNORMAL FINDING: Primary | ICD-10-CM

## 2023-01-25 PROCEDURE — 99395 PREV VISIT EST AGE 18-39: CPT | Performed by: NURSE PRACTITIONER

## 2023-01-25 NOTE — PROGRESS NOTES
Subjective   Chief Complaint   Patient presents with   • Annual Exam     Well woman exam     Bernadette Munroe is a 32 y.o. year old  presenting to be seen for her annual exam. Her last pap was in 2021 with normal cytology and negative HPV cotesting. She has a nexplanon which was placed 2021.  She complains of increased sweating. States her axilla and soles of her feet sweat excessively.   She has history of ectopic pregnancy, 2021.   She is interested in nexplanon removal. She would like to take a break from hormonal birth control.    SEXUAL Hx:  She is currently sexually active.  In the past year there there has been NO new sexual partners.    Condoms are never used.  She would not like to be screened for STD's at today's exam.  Current birth control method: Nexplanon.  She is not happy with her current method of contraception and does want to discuss alternative methods of contraception.  MENSTRUAL Hx:  Patient's last menstrual period was 2022 (approximate).  In the past 6 months her cycles have been regular, predictable and occur monthly.  Her menstrual flow is typically normal.   Each month on average there are roughly 0 day(s) of very heavy flow.    Intermenstrual bleeding is absent.    Post-coital bleeding is absent.  Dysmenorrhea: is not affecting her activities of daily living  PMS: is not affecting her activities of daily living  Her cycles are not a source of concern for her that she wishes to discuss today.  HEALTH Hx:  She exercises regularly: no (and has no plans to become more active).  She wears her seat belt: yes.  She has concerns about domestic violence: no.  OTHER THINGS SHE WANTS TO DISCUSS TODAY:  Nothing else    The following portions of the patient's history were reviewed and updated as appropriate:problem list, current medications, allergies, past family history, past medical history, past social history and past surgical history.    Social History    Tobacco Use      " Smoking status: Some Days        Types: Electronic Cigarette      Smokeless tobacco: Never      Tobacco comments: vapes    Review of Systems  Constitutional POS: nothing reported    NEG: anorexia or night sweats   Genitourinary POS: nothing reported    NEG: dysuria or hematuria      Gastointestinal POS: nothing reported    NEG: bloating, change in bowel habits, melena or reflux symptoms   Integument POS: nothing reported    NEG: moles that are changing in size, shape, color or rashes   Breast POS: nothing reported    NEG: persistent breast lump, skin dimpling or nipple discharge        Objective   /70 (BP Location: Right arm, Patient Position: Sitting, Cuff Size: Adult)   Ht 157.5 cm (62\")   Wt 50.8 kg (112 lb)   LMP 12/21/2022 (Approximate)   BMI 20.49 kg/m²     General:  well developed; well nourished  no acute distress   Skin:  No suspicious lesions seen   Thyroid: normal to inspection and palpation   Breasts:  Examined in supine position  Symmetric without masses or skin dimpling  Nipples normal without inversion, lesions or discharge  There are no palpable axillary nodes  Fibrocystic changes are present both breasts without a discrete mass   Abdomen: soft, non-tender; no masses  no umbilical or inguinal hernias are present  no hepato-splenomegaly   Pelvis: Clinical staff was present for exam  :  urethral meatus normal;  Vaginal:  normal pink mucosa without prolapse or lesions.  Cervix:  normal appearance.  Uterus:  normal size, shape and consistency.  Adnexa:  normal bimanual exam of the adnexa.  Rectal:  digital rectal exam not performed; anus visually normal appearing.   nexplanon palpated in correct placement in left upper extremity     Assessment   1. Well woman gynecological exam  2. nexplanon in place- desires removal  3. Excessive sweating     Plan     1. Pap was not done today.  I explained to Bernadette that the recommendations for Pap smear interval in a low risk patient who also has " negative hpv cotesting, has lengthened to 5 years time.  I told Bernadette she still needs to be seen in our office yearly for a full physical including breast and pelvic exam.  2. Will schedule back for nexplanon removal  3. Will refer to dermatology for excessive sweating  4. No prescription was given or electronically sent at today's visit  5. The importance of keeping all planned follow-up and taking all medications as prescribed was emphasized.  6. Today I discussed with Bernadette the total recommended calcium intake for a premenopausal female is 1000 mg.  Ideally this should be from dietary sources.  I reviewed calcium content in various foods including milk, fortified orange juice and yogurt.  If she cannot get sufficient calcium through dietary means, it is recommended to supplement with either a multivitamin or calcium to reach her daily goal.  I also reviewed the difference in the bioavailability of calcium carbonate and calcium citrate containing supplements and the importance of taking calcium carbonate containing products with food.  Finally, vitamin D's role in calcium absorption was reviewed and a total daily vitamin D intake of 800 units was recommended.  7. Follow up for annual exam 1 year and at her convenience for nexplanon removal    No orders of the defined types were placed in this encounter.         This note was electronically signed.    Abbey Kauffman, MARY  January 25, 2023

## 2023-10-09 ENCOUNTER — OFFICE VISIT (OUTPATIENT)
Dept: OBSTETRICS AND GYNECOLOGY | Facility: CLINIC | Age: 33
End: 2023-10-09
Payer: COMMERCIAL

## 2023-10-09 VITALS
HEIGHT: 62 IN | BODY MASS INDEX: 22.86 KG/M2 | DIASTOLIC BLOOD PRESSURE: 70 MMHG | WEIGHT: 124.2 LBS | SYSTOLIC BLOOD PRESSURE: 100 MMHG

## 2023-10-09 DIAGNOSIS — Z30.46 NEXPLANON REMOVAL: Primary | ICD-10-CM

## 2023-10-09 PROCEDURE — 11982 REMOVE DRUG IMPLANT DEVICE: CPT | Performed by: NURSE PRACTITIONER

## 2023-10-09 NOTE — PROGRESS NOTES
Nexplanon Removal    Date of procedure:  10/9/2023    Risks and benefits discussed? yes  All questions answered? yes  Consents given by the patient  Written consent obtained? yes    Local anesthesia used:  yes - 3 cc's of  Meds; anesthesia local: None, 1% lidocaine with epinephrine    Procedure documentation:    The upper left arm (non-dominant) was marked at the intended site of removal.  Betadine was used to cleanse the skin.  Local anesthesia was injected.  A vertical incision was created at the distal tip of the implant.  The implant was removed intact without difficulty.  Steri-strips were then placed across the site of insertion and the arm was wrapped.    She tolerated the procedure well.  There were no complications.  EBL was minimal.    Post procedure instructions: Remove the wrapping in 24 hours and the steri-strips in 5 days.    Follow up needed: PRN    This note was electronically signed.  Abbey Kauffman, APRN  October 9, 2023

## 2024-02-29 ENCOUNTER — OFFICE VISIT (OUTPATIENT)
Dept: FAMILY MEDICINE CLINIC | Facility: CLINIC | Age: 34
End: 2024-02-29
Payer: COMMERCIAL

## 2024-02-29 ENCOUNTER — LAB (OUTPATIENT)
Dept: LAB | Facility: HOSPITAL | Age: 34
End: 2024-02-29
Payer: COMMERCIAL

## 2024-02-29 VITALS
BODY MASS INDEX: 21.6 KG/M2 | WEIGHT: 117.4 LBS | HEART RATE: 101 BPM | RESPIRATION RATE: 21 BRPM | TEMPERATURE: 98.3 F | DIASTOLIC BLOOD PRESSURE: 76 MMHG | SYSTOLIC BLOOD PRESSURE: 106 MMHG | OXYGEN SATURATION: 98 % | HEIGHT: 62 IN

## 2024-02-29 DIAGNOSIS — Z11.59 NEED FOR HEPATITIS C SCREENING TEST: ICD-10-CM

## 2024-02-29 DIAGNOSIS — D64.9 ANEMIA, UNSPECIFIED TYPE: ICD-10-CM

## 2024-02-29 DIAGNOSIS — F41.1 GAD (GENERALIZED ANXIETY DISORDER): ICD-10-CM

## 2024-02-29 DIAGNOSIS — F32.1 CURRENT MODERATE EPISODE OF MAJOR DEPRESSIVE DISORDER WITHOUT PRIOR EPISODE: ICD-10-CM

## 2024-02-29 DIAGNOSIS — R87.619 ABNORMAL CERVICAL PAPANICOLAOU SMEAR, UNSPECIFIED ABNORMAL PAP FINDING: ICD-10-CM

## 2024-02-29 DIAGNOSIS — Z00.00 ENCOUNTER FOR ANNUAL PHYSICAL EXAM: Primary | ICD-10-CM

## 2024-02-29 DIAGNOSIS — J38.1 VOCAL CORD POLYPS: ICD-10-CM

## 2024-02-29 DIAGNOSIS — Z00.00 ENCOUNTER FOR ANNUAL PHYSICAL EXAM: ICD-10-CM

## 2024-02-29 LAB
25(OH)D3 SERPL-MCNC: 27 NG/ML (ref 30–100)
ALBUMIN SERPL-MCNC: 5 G/DL (ref 3.5–5.2)
ALBUMIN/GLOB SERPL: 2.1 G/DL
ALP SERPL-CCNC: 49 U/L (ref 39–117)
ALT SERPL W P-5'-P-CCNC: 11 U/L (ref 1–33)
ANION GAP SERPL CALCULATED.3IONS-SCNC: 12 MMOL/L (ref 5–15)
AST SERPL-CCNC: 16 U/L (ref 1–32)
BASOPHILS # BLD AUTO: 0.1 10*3/MM3 (ref 0–0.2)
BASOPHILS NFR BLD AUTO: 1.3 % (ref 0–1.5)
BILIRUB SERPL-MCNC: 0.4 MG/DL (ref 0–1.2)
BUN SERPL-MCNC: 11 MG/DL (ref 6–20)
BUN/CREAT SERPL: 10.7 (ref 7–25)
CALCIUM SPEC-SCNC: 10 MG/DL (ref 8.6–10.5)
CHLORIDE SERPL-SCNC: 105 MMOL/L (ref 98–107)
CO2 SERPL-SCNC: 22 MMOL/L (ref 22–29)
CREAT SERPL-MCNC: 1.03 MG/DL (ref 0.57–1)
DEPRECATED RDW RBC AUTO: 38.6 FL (ref 37–54)
EGFRCR SERPLBLD CKD-EPI 2021: 74.2 ML/MIN/1.73
EOSINOPHIL # BLD AUTO: 0.35 10*3/MM3 (ref 0–0.4)
EOSINOPHIL NFR BLD AUTO: 4.7 % (ref 0.3–6.2)
ERYTHROCYTE [DISTWIDTH] IN BLOOD BY AUTOMATED COUNT: 12.5 % (ref 12.3–15.4)
FERRITIN SERPL-MCNC: 73.6 NG/ML (ref 13–150)
GLOBULIN UR ELPH-MCNC: 2.4 GM/DL
GLUCOSE SERPL-MCNC: 88 MG/DL (ref 65–99)
HBA1C MFR BLD: 5.2 % (ref 4.8–5.6)
HCT VFR BLD AUTO: 42.8 % (ref 34–46.6)
HCV AB SER DONR QL: NORMAL
HGB BLD-MCNC: 14.5 G/DL (ref 12–15.9)
IMM GRANULOCYTES # BLD AUTO: 0.01 10*3/MM3 (ref 0–0.05)
IMM GRANULOCYTES NFR BLD AUTO: 0.1 % (ref 0–0.5)
IRON 24H UR-MRATE: 91 MCG/DL (ref 37–145)
IRON SATN MFR SERPL: 32 % (ref 20–50)
LYMPHOCYTES # BLD AUTO: 3.36 10*3/MM3 (ref 0.7–3.1)
LYMPHOCYTES NFR BLD AUTO: 45 % (ref 19.6–45.3)
MCH RBC QN AUTO: 29 PG (ref 26.6–33)
MCHC RBC AUTO-ENTMCNC: 33.9 G/DL (ref 31.5–35.7)
MCV RBC AUTO: 85.6 FL (ref 79–97)
MONOCYTES # BLD AUTO: 0.56 10*3/MM3 (ref 0.1–0.9)
MONOCYTES NFR BLD AUTO: 7.5 % (ref 5–12)
NEUTROPHILS NFR BLD AUTO: 3.08 10*3/MM3 (ref 1.7–7)
NEUTROPHILS NFR BLD AUTO: 41.4 % (ref 42.7–76)
NRBC BLD AUTO-RTO: 0 /100 WBC (ref 0–0.2)
PLATELET # BLD AUTO: 239 10*3/MM3 (ref 140–450)
PMV BLD AUTO: 11.3 FL (ref 6–12)
POTASSIUM SERPL-SCNC: 4.8 MMOL/L (ref 3.5–5.2)
PROT SERPL-MCNC: 7.4 G/DL (ref 6–8.5)
RBC # BLD AUTO: 5 10*6/MM3 (ref 3.77–5.28)
SODIUM SERPL-SCNC: 139 MMOL/L (ref 136–145)
TIBC SERPL-MCNC: 286 MCG/DL (ref 298–536)
TRANSFERRIN SERPL-MCNC: 192 MG/DL (ref 200–360)
TSH SERPL DL<=0.05 MIU/L-ACNC: 1.76 UIU/ML (ref 0.27–4.2)
VIT B12 BLD-MCNC: 746 PG/ML (ref 211–946)
WBC NRBC COR # BLD AUTO: 7.46 10*3/MM3 (ref 3.4–10.8)

## 2024-02-29 PROCEDURE — 84466 ASSAY OF TRANSFERRIN: CPT

## 2024-02-29 PROCEDURE — 36415 COLL VENOUS BLD VENIPUNCTURE: CPT

## 2024-02-29 PROCEDURE — 83036 HEMOGLOBIN GLYCOSYLATED A1C: CPT

## 2024-02-29 PROCEDURE — 80050 GENERAL HEALTH PANEL: CPT

## 2024-02-29 PROCEDURE — 83540 ASSAY OF IRON: CPT

## 2024-02-29 PROCEDURE — 82306 VITAMIN D 25 HYDROXY: CPT

## 2024-02-29 PROCEDURE — 82607 VITAMIN B-12: CPT

## 2024-02-29 PROCEDURE — 86803 HEPATITIS C AB TEST: CPT

## 2024-02-29 PROCEDURE — 82728 ASSAY OF FERRITIN: CPT

## 2024-02-29 RX ORDER — BUSPIRONE HYDROCHLORIDE 7.5 MG/1
7.5 TABLET ORAL 3 TIMES DAILY
Qty: 90 TABLET | Refills: 2 | Status: SHIPPED | OUTPATIENT
Start: 2024-02-29

## 2024-02-29 RX ORDER — FLUOXETINE 10 MG/1
10 CAPSULE ORAL DAILY
Qty: 30 CAPSULE | Refills: 2 | Status: SHIPPED | OUTPATIENT
Start: 2024-02-29

## 2024-02-29 NOTE — PROGRESS NOTES
Subjective     Chief Complaint  Establish Care, Anxiety, and Depression    Subjective          Bernadette Munroe is a 33 y.o. female who presents today to [facility] for follow up.    HPI:   History of Present Illness    Ms. Munroe is a very pleasant 33 year old female who presents today to establish care with a primary care physician.     Her past medical history includes laryngeal polyps (multiple surgical history) Anxiety/ Depression, prior history of alcohol abuse, sober since 2018.    She has a history of anxiety and depression. She lost her mother in 2022. She previously was using THC vape to manage her anxiety, she stopped using this due to having some irritation of her throat. She is also tapering from nicotine vapes as well.  Right now, the anxiety component is more bothersome than the depression. She is starting grief counseling.            The following portions of the patient's history were reviewed and updated as appropriate: allergies, current medications, past family history, past medical history, past social history, past surgical history and problem list.    Objective     Objective     Allergy:   Allergies   Allergen Reactions    Sulfa Antibiotics Unknown - Low Severity     UNKNOWN-MOTHER TOLD ME        Current Medications:   Current Outpatient Medications   Medication Sig Dispense Refill    busPIRone (BUSPAR) 7.5 MG tablet Take 1 tablet by mouth 3 (Three) Times a Day. 90 tablet 2    FLUoxetine (PROzac) 10 MG capsule Take 1 capsule by mouth Daily. 30 capsule 2     No current facility-administered medications for this visit.       Past Medical History:  Past Medical History:   Diagnosis Date    Abnormal Pap smear of cervix     Anxiety     Chlamydia 2006/2007    Depression 2007    History of chlamydia infection     HPV (human papilloma virus) infection     Ovarian cyst     Tubal pregnancy        Past Surgical History:  Past Surgical History:   Procedure  "Laterality Date    DIAGNOSTIC LAPAROSCOPY N/A 7/2/2021    Procedure: DIAGNOSTIC LAPAROSCOPY  Right cornual ectopic wedge resection with mini laparotomy;  Surgeon: Stella Read MD;  Location: [facility];  Service: Obstetrics/Gynecology;  Laterality: N/A;    LAPAROSCOPY W/ MINI-LAPAROTOMY  07/02/2021    for right cornual/interstitial ectopic pregnancy, wedge resection and right salpingectomy     SALPINGECTOMY Right 07/02/2021    for right cornual/interstitial ectopic pregnancy, wedge resection and right salpingectomy     THROAT SURGERY      POLYPS ON VOCAL CORDS     TONSILLECTOMY         Social History:  Social History     Socioeconomic History    Marital status:     Number of children: 1    Highest education level: Some college, no degree   Tobacco Use    Smoking status: Never     Passive exposure: Never    Smokeless tobacco: Never    Tobacco comments:     vapes   Vaping Use    Vaping Use: Former    Start date: 9/1/2020    Quit date: 2/22/2024    Substances: Nicotine, THC, CBD, Flavoring    Devices: Disposable    Passive vaping exposure: Yes   Substance and Sexual Activity    Alcohol use: Not Currently    Drug use: Not Currently     Types: Marijuana    Sexual activity: Yes     Partners: Male     Birth control/protection: None       Family History:  Family History   Problem Relation Age of Onset    COPD Mother     COPD Father     Lung disease Maternal Aunt     No Known Problems Maternal Grandmother     Liver cancer Maternal Grandfather     No Known Problems Paternal Grandmother     No Known Problems Paternal Grandfather     Breast cancer Neg Hx     Ovarian cancer Neg Hx     Uterine cancer Neg Hx     Colon cancer Neg Hx     Osteoporosis Neg Hx          Vital Signs:   /76 (BP Location: Right arm, Patient Position: Sitting, Cuff Size: Adult)   Pulse 101   Temp 98.3 °F (36.8 °C) (Infrared)   Resp 21   Ht 157.5 cm (62\")   Wt 53.3 kg (117 lb 6.4 oz)   SpO2 98%   BMI 21.47 kg/m²      Physical " Exam:  Physical Exam  Constitutional:       Appearance: She is not ill-appearing.   Eyes:      Pupils: Pupils are equal, round, and reactive to light.   Cardiovascular:      Rate and Rhythm: Normal rate.      Pulses: Normal pulses.   Pulmonary:      Effort: Pulmonary effort is normal.      Breath sounds: Normal breath sounds.   Neurological:      General: No focal deficit present.      Mental Status: She is alert. Mental status is at baseline.   Psychiatric:         Mood and Affect: Mood normal.         Thought Content: Thought content normal.         Judgment: Judgment normal.               PHQ-9 Score  PHQ-9 Total Score: 12     PHQ-9 Depression Screening  Little interest or pleasure in doing things? 1-->several days   Feeling down, depressed, or hopeless? 1-->several days   Trouble falling or staying asleep, or sleeping too much? 3-->nearly every day (Staying asleep)   Feeling tired or having little energy? 1-->several days   Poor appetite or overeating? 3-->nearly every day (Poor)   Feeling bad about yourself - or that you are a failure or have let yourself or your family down? 0-->not at all   Trouble concentrating on things, such as reading the newspaper or watching television? 3-->nearly every day   Moving or speaking so slowly that other people could have noticed? Or the opposite - being so fidgety or restless that you have been moving around a lot more than usual? 0-->not at all   Thoughts that you would be better off dead, or of hurting yourself in some way? 0-->not at all   PHQ-9 Total Score 12   If you checked off any problems, how difficult have these problems made it for you to do your work, take care of things at home, or get along with other people? very difficult         Lab Review  Lab on 02/29/2024   Component Date Value Ref Range Status    Glucose 02/29/2024 88  65 - 99 mg/dL Final    BUN 02/29/2024 11  6 - 20 mg/dL Final    Creatinine 02/29/2024 1.03 (H)  0.57 - 1.00 mg/dL Final    Sodium  02/29/2024 139  136 - 145 mmol/L Final    Potassium 02/29/2024 4.8  3.5 - 5.2 mmol/L Final    Chloride 02/29/2024 105  98 - 107 mmol/L Final    CO2 02/29/2024 22.0  22.0 - 29.0 mmol/L Final    Calcium 02/29/2024 10.0  8.6 - 10.5 mg/dL Final    Total Protein 02/29/2024 7.4  6.0 - 8.5 g/dL Final    Albumin 02/29/2024 5.0  3.5 - 5.2 g/dL Final    ALT (SGPT) 02/29/2024 11  1 - 33 U/L Final    AST (SGOT) 02/29/2024 16  1 - 32 U/L Final    Alkaline Phosphatase 02/29/2024 49  39 - 117 U/L Final    Total Bilirubin 02/29/2024 0.4  0.0 - 1.2 mg/dL Final    Globulin 02/29/2024 2.4  gm/dL Final    A/G Ratio 02/29/2024 2.1  g/dL Final    BUN/Creatinine Ratio 02/29/2024 10.7  7.0 - 25.0 Final    Anion Gap 02/29/2024 12.0  5.0 - 15.0 mmol/L Final    eGFR 02/29/2024 74.2  >60.0 mL/min/1.73 Final    Hemoglobin A1C 02/29/2024 5.20  4.80 - 5.60 % Final    TSH 02/29/2024 1.760  0.270 - 4.200 uIU/mL Final    Vitamin B-12 02/29/2024 746  211 - 946 pg/mL Final    25 Hydroxy, Vitamin D 02/29/2024 27.0 (L)  30.0 - 100.0 ng/ml Final    Iron 02/29/2024 91  37 - 145 mcg/dL Final    Iron Saturation (TSAT) 02/29/2024 32  20 - 50 % Final    Transferrin 02/29/2024 192 (L)  200 - 360 mg/dL Final    TIBC 02/29/2024 286 (L)  298 - 536 mcg/dL Final    Ferritin 02/29/2024 73.60  13.00 - 150.00 ng/mL Final    Hepatitis C Ab 02/29/2024 Non-Reactive  Non-Reactive Final    WBC 02/29/2024 7.46  3.40 - 10.80 10*3/mm3 Final    RBC 02/29/2024 5.00  3.77 - 5.28 10*6/mm3 Final    Hemoglobin 02/29/2024 14.5  12.0 - 15.9 g/dL Final    Hematocrit 02/29/2024 42.8  34.0 - 46.6 % Final    MCV 02/29/2024 85.6  79.0 - 97.0 fL Final    MCH 02/29/2024 29.0  26.6 - 33.0 pg Final    MCHC 02/29/2024 33.9  31.5 - 35.7 g/dL Final    RDW 02/29/2024 12.5  12.3 - 15.4 % Final    RDW-SD 02/29/2024 38.6  37.0 - 54.0 fl Final    MPV 02/29/2024 11.3  6.0 - 12.0 fL Final    Platelets 02/29/2024 239  140 - 450 10*3/mm3 Final    Neutrophil % 02/29/2024 41.4 (L)  42.7 - 76.0 % Final     Lymphocyte % 02/29/2024 45.0  19.6 - 45.3 % Final    Monocyte % 02/29/2024 7.5  5.0 - 12.0 % Final    Eosinophil % 02/29/2024 4.7  0.3 - 6.2 % Final    Basophil % 02/29/2024 1.3  0.0 - 1.5 % Final    Immature Grans % 02/29/2024 0.1  0.0 - 0.5 % Final    Neutrophils, Absolute 02/29/2024 3.08  1.70 - 7.00 10*3/mm3 Final    Lymphocytes, Absolute 02/29/2024 3.36 (H)  0.70 - 3.10 10*3/mm3 Final    Monocytes, Absolute 02/29/2024 0.56  0.10 - 0.90 10*3/mm3 Final    Eosinophils, Absolute 02/29/2024 0.35  0.00 - 0.40 10*3/mm3 Final    Basophils, Absolute 02/29/2024 0.10  0.00 - 0.20 10*3/mm3 Final    Immature Grans, Absolute 02/29/2024 0.01  0.00 - 0.05 10*3/mm3 Final    nRBC 02/29/2024 0.0  0.0 - 0.2 /100 WBC Final        Radiology Results        Assessment / Plan         Assessment and Plan   Diagnoses and all orders for this visit:    1. Encounter for annual physical exam (Primary)  Assessment & Plan:  Annual wellness exam completed today. Health Maintenance including immunizations was updated and reflected in the chart. Yearly screening labs were ordered.     Further recommendations to be given once lab data received.     Health advice: healthy food choices with fresh fruits and vegetables, maintain sleep pattern at least 8 hours, avoid texting and distracted driving practices; wear safety belt, engage in regular exercise, maintain healthy weight, use safe sex practices, avoid alcohol and illicit drugs. Maintain immunizations that are up to date. Maintain health maintenance:PAP Follow up with PCP if struggling with depression or anxiety. Keep regular dental and eye exams. Brush and floss teeth daily.     I suggest they take a daily multivitamin that is age-appropriate (example women's One-A-Day.)  Patient voiced understanding of these instructions.     Follow up Annually for Physical       Orders:  -     CBC & Differential; Future  -     Comprehensive Metabolic Panel; Future  -     Hemoglobin A1c; Future  -     TSH Rfx  On Abnormal To Free T4; Future  -     Vitamin B12; Future  -     Vitamin D,25-Hydroxy; Future  -     Iron Profile; Future  -     Ferritin; Future    2. Vocal cord polyps  -     Ambulatory Referral to ENT (Otolaryngology)    3. MIK (generalized anxiety disorder)  Assessment & Plan:  Psychological condition is worsening.  Medication changes per orders.  Psychological condition  will be reassessed at the next regular appointment.    Orders:  -     FLUoxetine (PROzac) 10 MG capsule; Take 1 capsule by mouth Daily.  Dispense: 30 capsule; Refill: 2  -     busPIRone (BUSPAR) 7.5 MG tablet; Take 1 tablet by mouth 3 (Three) Times a Day.  Dispense: 90 tablet; Refill: 2    4. Current moderate episode of major depressive disorder without prior episode  -     FLUoxetine (PROzac) 10 MG capsule; Take 1 capsule by mouth Daily.  Dispense: 30 capsule; Refill: 2    5. Anemia, unspecified type  -     Iron Profile; Future  -     Ferritin; Future    6. Need for hepatitis C screening test  -     Hepatitis C Antibody; Future    7. Abnormal cervical Papanicolaou smear, unspecified abnormal pap finding  -     Ambulatory Referral to Gynecology        Discussed possible differential diagnoses, testing, treatment, recommended non-pharmacological interventions, risks, warning signs to monitor for that would indicate need for follow-up in clinic or ER. If no improvement with these regimens or you have new or worsening symptoms follow-up. Patient verbalizes understanding and agreement with plan of care. Denies further needs or concerns.     Patient was given instructions and counseling regarding her condition and for health maintenance advised.    BMI is within normal parameters. No other follow-up for BMI required.        Health Maintenance  Health Maintenance:   Health Maintenance Due   Topic Date Due    TDAP/TD VACCINES (1 - Tdap) Never done        Meds ordered during this visit  New Medications Ordered This Visit   Medications    FLUoxetine  (PROzac) 10 MG capsule     Sig: Take 1 capsule by mouth Daily.     Dispense:  30 capsule     Refill:  2    busPIRone (BUSPAR) 7.5 MG tablet     Sig: Take 1 tablet by mouth 3 (Three) Times a Day.     Dispense:  90 tablet     Refill:  2       Meds stopped during this visit:  There are no discontinued medications.     Visit Diagnoses    ICD-10-CM ICD-9-CM   1. Encounter for annual physical exam  Z00.00 V70.0   2. Vocal cord polyps  J38.1 478.4   3. MIK (generalized anxiety disorder)  F41.1 300.02   4. Current moderate episode of major depressive disorder without prior episode  F32.1 296.22   5. Anemia, unspecified type  D64.9 285.9   6. Need for hepatitis C screening test  Z11.59 V73.89   7. Abnormal cervical Papanicolaou smear, unspecified abnormal pap finding  R87.619 795.00       Patient was given instructions and counseling regarding her condition or for health maintenance advice. Please see specific information pulled into the AVS if appropriate.     Follow Up   Return in about 6 weeks (around 4/11/2024) for followup Anxiety and Depression .          This document has been electronically signed by Perlita Webster DO   March 1, 2024 15:33 EST    Dictated Utilizing Dragon Dictation: Part of this note may be an electronic transcription/translation of spoken language to printed text using the Dragon Dictation System.    Perlita Webster D.O.  [facility] [place]

## 2024-03-01 NOTE — ASSESSMENT & PLAN NOTE
Annual wellness exam completed today. Health Maintenance including immunizations was updated and reflected in the chart. Yearly screening labs were ordered.     Further recommendations to be given once lab data received.     Health advice: healthy food choices with fresh fruits and vegetables, maintain sleep pattern at least 8 hours, avoid texting and distracted driving practices; wear safety belt, engage in regular exercise, maintain healthy weight, use safe sex practices, avoid alcohol and illicit drugs. Maintain immunizations that are up to date. Maintain health maintenance:PAP Follow up with PCP if struggling with depression or anxiety. Keep regular dental and eye exams. Brush and floss teeth daily.     I suggest they take a daily multivitamin that is age-appropriate (example women's One-A-Day.)  Patient voiced understanding of these instructions.     Follow up Annually for Physical

## 2024-04-18 ENCOUNTER — OFFICE VISIT (OUTPATIENT)
Dept: FAMILY MEDICINE CLINIC | Facility: CLINIC | Age: 34
End: 2024-04-18
Payer: COMMERCIAL

## 2024-04-18 VITALS
TEMPERATURE: 97.7 F | SYSTOLIC BLOOD PRESSURE: 116 MMHG | BODY MASS INDEX: 22.89 KG/M2 | HEIGHT: 62 IN | WEIGHT: 124.4 LBS | HEART RATE: 60 BPM | OXYGEN SATURATION: 98 % | DIASTOLIC BLOOD PRESSURE: 70 MMHG

## 2024-04-18 DIAGNOSIS — F41.1 GAD (GENERALIZED ANXIETY DISORDER): Primary | ICD-10-CM

## 2024-04-18 DIAGNOSIS — F32.1 CURRENT MODERATE EPISODE OF MAJOR DEPRESSIVE DISORDER WITHOUT PRIOR EPISODE: ICD-10-CM

## 2024-04-18 PROCEDURE — 99214 OFFICE O/P EST MOD 30 MIN: CPT | Performed by: FAMILY MEDICINE

## 2024-04-18 RX ORDER — BUSPIRONE HYDROCHLORIDE 10 MG/1
10 TABLET ORAL 3 TIMES DAILY
Qty: 90 TABLET | Refills: 2 | Status: SHIPPED | OUTPATIENT
Start: 2024-04-18

## 2024-04-18 RX ORDER — FLUOXETINE HYDROCHLORIDE 20 MG/1
20 CAPSULE ORAL DAILY
Qty: 30 CAPSULE | Refills: 2 | Status: SHIPPED | OUTPATIENT
Start: 2024-04-18

## 2024-04-18 NOTE — ASSESSMENT & PLAN NOTE
Psychological condition is worsening.  Medication changes per orders.  Psychological condition  will be reassessed at the next regular appointment.      -Adjusting patient's medications.  Increase Prozac to 20 mg daily and BuSpar to 10 mg 3 times daily.

## 2024-04-18 NOTE — PROGRESS NOTES
Subjective     Chief Complaint  Anxiety    Subjective          Bernadette Munroe is a 33 y.o. female who presents today to [facility] for follow up.    HPI:   Anxiety        Ms. Munroe is a very pleasant 33 year old female who presents today to  follow up.     Her past medical history includes laryngeal polyps (multiple surgical history) Anxiety/ Depression, prior history of alcohol abuse, sober since 2018.    She has a history of anxiety and depression. She lost her mother in 2022. She previously was using THC vape to manage her anxiety, she stopped using this due to having some irritation of her throat. She is also tapering from nicotine vapes as well.  Right now, the anxiety component is more bothersome than the depression. She is starting grief counseling.     -- the depression side of things are improved with Prozac 10 mg daily but the anxiety is still pretty present. She is following with counseling. Buspar isn't really helping with the anxiety component just yet.       The following portions of the patient's history were reviewed and updated as appropriate: allergies, current medications, past family history, past medical history, past social history, past surgical history and problem list.    Objective     Objective     Allergy:   Allergies   Allergen Reactions    Sulfa Antibiotics Unknown - Low Severity     UNKNOWN-MOTHER TOLD ME        Current Medications:   Current Outpatient Medications   Medication Sig Dispense Refill    busPIRone (BUSPAR) 10 MG tablet Take 1 tablet by mouth 3 (Three) Times a Day. 90 tablet 2    FLUoxetine (PROzac) 20 MG capsule Take 1 capsule by mouth Daily. 30 capsule 2     No current facility-administered medications for this visit.       Past Medical History:  Past Medical History:   Diagnosis Date    Abnormal Pap smear of cervix     Anxiety     Chlamydia 2006/2007    Depression 2007    History of chlamydia infection     HPV (human papilloma  "virus) infection     Ovarian cyst     Tubal pregnancy        Past Surgical History:  Past Surgical History:   Procedure Laterality Date    DIAGNOSTIC LAPAROSCOPY N/A 7/2/2021    Procedure: DIAGNOSTIC LAPAROSCOPY  Right cornual ectopic wedge resection with mini laparotomy;  Surgeon: Stella Read MD;  Location: [facility];  Service: Obstetrics/Gynecology;  Laterality: N/A;    LAPAROSCOPY W/ MINI-LAPAROTOMY  07/02/2021    for right cornual/interstitial ectopic pregnancy, wedge resection and right salpingectomy     SALPINGECTOMY Right 07/02/2021    for right cornual/interstitial ectopic pregnancy, wedge resection and right salpingectomy     THROAT SURGERY      POLYPS ON VOCAL CORDS     TONSILLECTOMY         Social History:  Social History     Socioeconomic History    Marital status:     Number of children: 1    Highest education level: Some college, no degree   Tobacco Use    Smoking status: Some Days     Types: Electronic Cigarette     Passive exposure: Never    Smokeless tobacco: Never    Tobacco comments:     vapes   Vaping Use    Vaping status: Some Days    Start date: 2/4/2024    Substances: Nicotine, THC, CBD, Flavoring    Devices: Disposable    Passive vaping exposure: Yes   Substance and Sexual Activity    Alcohol use: Not Currently    Drug use: Never     Types: Marijuana    Sexual activity: Yes     Partners: Male     Birth control/protection: Nexplanon       Family History:  Family History   Problem Relation Age of Onset    COPD Mother     COPD Father     Lung disease Maternal Aunt     No Known Problems Maternal Grandmother     Liver cancer Maternal Grandfather     No Known Problems Paternal Grandmother     No Known Problems Paternal Grandfather     Breast cancer Neg Hx     Ovarian cancer Neg Hx     Uterine cancer Neg Hx     Colon cancer Neg Hx     Osteoporosis Neg Hx          Vital Signs:   /70   Pulse 60   Temp 97.7 °F (36.5 °C) (Temporal)   Ht 157.5 cm (62.01\")   Wt 56.4 kg (124 lb 6.4 " oz)   SpO2 98%   BMI 22.75 kg/m²      Physical Exam:  Physical Exam  Constitutional:       Appearance: She is not ill-appearing.   Eyes:      Pupils: Pupils are equal, round, and reactive to light.   Cardiovascular:      Rate and Rhythm: Normal rate.      Pulses: Normal pulses.   Pulmonary:      Effort: Pulmonary effort is normal.      Breath sounds: Normal breath sounds.   Neurological:      General: No focal deficit present.      Mental Status: She is alert. Mental status is at baseline.   Psychiatric:         Mood and Affect: Mood normal.         Thought Content: Thought content normal.         Judgment: Judgment normal.                   Lab Review  Lab on 02/29/2024   Component Date Value Ref Range Status    Glucose 02/29/2024 88  65 - 99 mg/dL Final    BUN 02/29/2024 11  6 - 20 mg/dL Final    Creatinine 02/29/2024 1.03 (H)  0.57 - 1.00 mg/dL Final    Sodium 02/29/2024 139  136 - 145 mmol/L Final    Potassium 02/29/2024 4.8  3.5 - 5.2 mmol/L Final    Chloride 02/29/2024 105  98 - 107 mmol/L Final    CO2 02/29/2024 22.0  22.0 - 29.0 mmol/L Final    Calcium 02/29/2024 10.0  8.6 - 10.5 mg/dL Final    Total Protein 02/29/2024 7.4  6.0 - 8.5 g/dL Final    Albumin 02/29/2024 5.0  3.5 - 5.2 g/dL Final    ALT (SGPT) 02/29/2024 11  1 - 33 U/L Final    AST (SGOT) 02/29/2024 16  1 - 32 U/L Final    Alkaline Phosphatase 02/29/2024 49  39 - 117 U/L Final    Total Bilirubin 02/29/2024 0.4  0.0 - 1.2 mg/dL Final    Globulin 02/29/2024 2.4  gm/dL Final    A/G Ratio 02/29/2024 2.1  g/dL Final    BUN/Creatinine Ratio 02/29/2024 10.7  7.0 - 25.0 Final    Anion Gap 02/29/2024 12.0  5.0 - 15.0 mmol/L Final    eGFR 02/29/2024 74.2  >60.0 mL/min/1.73 Final    Hemoglobin A1C 02/29/2024 5.20  4.80 - 5.60 % Final    TSH 02/29/2024 1.760  0.270 - 4.200 uIU/mL Final    Vitamin B-12 02/29/2024 746  211 - 946 pg/mL Final    25 Hydroxy, Vitamin D 02/29/2024 27.0 (L)  30.0 - 100.0 ng/ml Final    Iron 02/29/2024 91  37 - 145 mcg/dL Final     Iron Saturation (TSAT) 02/29/2024 32  20 - 50 % Final    Transferrin 02/29/2024 192 (L)  200 - 360 mg/dL Final    TIBC 02/29/2024 286 (L)  298 - 536 mcg/dL Final    Ferritin 02/29/2024 73.60  13.00 - 150.00 ng/mL Final    Hepatitis C Ab 02/29/2024 Non-Reactive  Non-Reactive Final    WBC 02/29/2024 7.46  3.40 - 10.80 10*3/mm3 Final    RBC 02/29/2024 5.00  3.77 - 5.28 10*6/mm3 Final    Hemoglobin 02/29/2024 14.5  12.0 - 15.9 g/dL Final    Hematocrit 02/29/2024 42.8  34.0 - 46.6 % Final    MCV 02/29/2024 85.6  79.0 - 97.0 fL Final    MCH 02/29/2024 29.0  26.6 - 33.0 pg Final    MCHC 02/29/2024 33.9  31.5 - 35.7 g/dL Final    RDW 02/29/2024 12.5  12.3 - 15.4 % Final    RDW-SD 02/29/2024 38.6  37.0 - 54.0 fl Final    MPV 02/29/2024 11.3  6.0 - 12.0 fL Final    Platelets 02/29/2024 239  140 - 450 10*3/mm3 Final    Neutrophil % 02/29/2024 41.4 (L)  42.7 - 76.0 % Final    Lymphocyte % 02/29/2024 45.0  19.6 - 45.3 % Final    Monocyte % 02/29/2024 7.5  5.0 - 12.0 % Final    Eosinophil % 02/29/2024 4.7  0.3 - 6.2 % Final    Basophil % 02/29/2024 1.3  0.0 - 1.5 % Final    Immature Grans % 02/29/2024 0.1  0.0 - 0.5 % Final    Neutrophils, Absolute 02/29/2024 3.08  1.70 - 7.00 10*3/mm3 Final    Lymphocytes, Absolute 02/29/2024 3.36 (H)  0.70 - 3.10 10*3/mm3 Final    Monocytes, Absolute 02/29/2024 0.56  0.10 - 0.90 10*3/mm3 Final    Eosinophils, Absolute 02/29/2024 0.35  0.00 - 0.40 10*3/mm3 Final    Basophils, Absolute 02/29/2024 0.10  0.00 - 0.20 10*3/mm3 Final    Immature Grans, Absolute 02/29/2024 0.01  0.00 - 0.05 10*3/mm3 Final    nRBC 02/29/2024 0.0  0.0 - 0.2 /100 WBC Final        Radiology Results        Assessment / Plan         Assessment and Plan   Diagnoses and all orders for this visit:    1. MIK (generalized anxiety disorder) (Primary)  Assessment & Plan:  Psychological condition is worsening.  Medication changes per orders.  Psychological condition  will be reassessed at the next regular  appointment.      -Adjusting patient's medications.  Increase Prozac to 20 mg daily and BuSpar to 10 mg 3 times daily.    Orders:  -     FLUoxetine (PROzac) 20 MG capsule; Take 1 capsule by mouth Daily.  Dispense: 30 capsule; Refill: 2  -     busPIRone (BUSPAR) 10 MG tablet; Take 1 tablet by mouth 3 (Three) Times a Day.  Dispense: 90 tablet; Refill: 2    2. Current moderate episode of major depressive disorder without prior episode  -     FLUoxetine (PROzac) 20 MG capsule; Take 1 capsule by mouth Daily.  Dispense: 30 capsule; Refill: 2  -     busPIRone (BUSPAR) 10 MG tablet; Take 1 tablet by mouth 3 (Three) Times a Day.  Dispense: 90 tablet; Refill: 2          Discussed possible differential diagnoses, testing, treatment, recommended non-pharmacological interventions, risks, warning signs to monitor for that would indicate need for follow-up in clinic or ER. If no improvement with these regimens or you have new or worsening symptoms follow-up. Patient verbalizes understanding and agreement with plan of care. Denies further needs or concerns.     Patient was given instructions and counseling regarding her condition and for health maintenance advised.    BMI is within normal parameters. No other follow-up for BMI required.        Health Maintenance  Health Maintenance:   There are no preventive care reminders to display for this patient.       Meds ordered during this visit  New Medications Ordered This Visit   Medications    FLUoxetine (PROzac) 20 MG capsule     Sig: Take 1 capsule by mouth Daily.     Dispense:  30 capsule     Refill:  2    busPIRone (BUSPAR) 10 MG tablet     Sig: Take 1 tablet by mouth 3 (Three) Times a Day.     Dispense:  90 tablet     Refill:  2       Meds stopped during this visit:  Medications Discontinued During This Encounter   Medication Reason    FLUoxetine (PROzac) 10 MG capsule Reorder    busPIRone (BUSPAR) 7.5 MG tablet Reorder        Visit Diagnoses    ICD-10-CM ICD-9-CM   1. MIK  (generalized anxiety disorder)  F41.1 300.02   2. Current moderate episode of major depressive disorder without prior episode  F32.1 296.22         Patient was given instructions and counseling regarding her condition or for health maintenance advice. Please see specific information pulled into the AVS if appropriate.     Follow Up   Return in about 6 weeks (around 5/30/2024) for followup anxiety .          This document has been electronically signed by Perlita Webster DO   April 18, 2024 11:09 EDT    Dictated Utilizing Dragon Dictation: Part of this note may be an electronic transcription/translation of spoken language to printed text using the Dragon Dictation System.    Perlita Webster D.O.  [facility]   Answers submitted by the patient for this visit:  Other (Submitted on 4/18/2024)  Please describe your symptoms.: Follow up from last visit. Check in medications.  Have you had these symptoms before?: No  How long have you been having these symptoms?: Greater than 2 weeks  Primary Reason for Visit (Submitted on 4/18/2024)  What is the primary reason for your visit?: Other

## 2024-07-05 ENCOUNTER — OFFICE VISIT (OUTPATIENT)
Dept: OBSTETRICS AND GYNECOLOGY | Facility: CLINIC | Age: 34
End: 2024-07-05
Payer: COMMERCIAL

## 2024-07-05 VITALS
WEIGHT: 125 LBS | BODY MASS INDEX: 22.86 KG/M2 | DIASTOLIC BLOOD PRESSURE: 60 MMHG | RESPIRATION RATE: 16 BRPM | SYSTOLIC BLOOD PRESSURE: 110 MMHG

## 2024-07-05 DIAGNOSIS — Z23 VACCINE FOR HUMAN PAPILLOMA VIRUS (HPV) TYPES 6, 11, 16, AND 18 ADMINISTERED: ICD-10-CM

## 2024-07-05 DIAGNOSIS — Z01.419 ENCOUNTER FOR WELL WOMAN EXAM WITH ROUTINE GYNECOLOGICAL EXAM: Primary | ICD-10-CM

## 2024-07-05 DIAGNOSIS — Z71.85 HPV VACCINE COUNSELING: ICD-10-CM

## 2024-07-05 NOTE — PROGRESS NOTES
Subjective   Chief Complaint   Patient presents with    Annual Exam     Bernadette Munroe is a 34 y.o. year old  presenting to be seen for her annual exam. She is overall feeling well today.     SEXUAL Hx:  She is currently sexually active.  In the past year there there has been NO new sexual partners.    Condoms are never used.  She would not like to be screened for STD's at today's exam.  Current birth control method: vasectomy.  She is happy with her current method of contraception and does not want to discuss alternative methods of contraception.  MENSTRUAL Hx:  Patient's last menstrual period was 2024.  In the past 6 months her cycles have been regular, predictable and occur monthly.  Her menstrual flow is typically normal.   Each month on average there are roughly 0 day(s) of very heavy flow.  Total days of flow is typically about 3 days  Intermenstrual bleeding is absent.    Post-coital bleeding is absent.  Dysmenorrhea: none and is not affecting her activities of daily living  PMS: mild and is not affecting her activities of daily living  Her cycles are not a source of concern for her that she wishes to discuss today.  HEALTH Hx:  She exercises regularly: yes. She walks at least an hour a day  She wears her seat belt: yes.  She has concerns about domestic violence: no.  OTHER THINGS SHE WANTS TO DISCUSS TODAY:  Nothing else    The following portions of the patient's history were reviewed and updated as appropriate:problem list, current medications, allergies, past family history, past medical history, past social history, and past surgical history.    Social History    Tobacco Use      Smoking status: Some Days        Types: Electronic Cigarette        Passive exposure: Never      Smokeless tobacco: Never      Tobacco comments: vapes      Review of Systems   Constitutional: Negative.  Negative for appetite change and diaphoresis.   Respiratory: Negative.     Cardiovascular: Negative.     Gastrointestinal: Negative.  Negative for abdominal distention, blood in stool, GERD and indigestion.   Endocrine: Negative.    Genitourinary: Negative.  Negative for breast discharge, breast lump, breast pain, dysuria and hematuria.   Skin: Negative.           Objective   /60   Resp 16   Wt 56.7 kg (125 lb)   LMP 06/26/2024   BMI 22.86 kg/m²     Physical Exam  Vitals reviewed. Exam conducted with a chaperone present.   Constitutional:       Appearance: Normal appearance. She is normal weight.   Cardiovascular:      Rate and Rhythm: Normal rate and regular rhythm.      Heart sounds: Normal heart sounds.   Pulmonary:      Effort: Pulmonary effort is normal.      Breath sounds: Normal breath sounds.   Chest:   Breasts:     Right: Normal.      Left: Normal.   Abdominal:      General: Bowel sounds are normal.      Palpations: Abdomen is soft.   Genitourinary:     General: Normal vulva.      Exam position: Lithotomy position.      Vagina: Normal.      Cervix: Normal.      Uterus: Normal.       Adnexa: Right adnexa normal and left adnexa normal.      Rectum: Normal.      Comments: Rectal exam not done but appears normal visually  Neurological:      Mental Status: She is alert and oriented to person, place, and time.   Psychiatric:         Mood and Affect: Mood normal.         Behavior: Behavior normal.         Thought Content: Thought content normal.         Judgment: Judgment normal.            Diagnoses and all orders for this visit:    1. Encounter for well woman exam with routine gynecological exam (Primary)  -     LIQUID-BASED PAP SMEAR WITH HPV GENOTYPING REGARDLESS OF INTERPRETATION ([facility]); Future  -     LIQUID-BASED PAP SMEAR WITH HPV GENOTYPING REGARDLESS OF INTERPRETATION ([facility])    2. HPV vaccine counseling        No prescription was given or electronically sent at today's visit    The importance of keeping all planned follow-up and taking all medications as prescribed was  emphasized.    Today I discussed with Bernadette the total recommended calcium intake for a premenopausal female is 1000 mg.  Ideally this should be from dietary sources.  I reviewed calcium content in various foods including milk, fortified orange juice and yogurt.  If she cannot get sufficient calcium through dietary means, it is recommended to supplement with either a multivitamin or calcium to reach her daily goal.  I also reviewed the difference in the bioavailability of calcium carbonate and calcium citrate containing supplements and the importance of taking calcium carbonate containing products with food.  Finally, vitamin D's role in calcium absorption was reviewed and a total daily vitamin D intake of 800 units was recommended.    I discussed with Bernadette that she may be behind on needed vaccinations for HPV ([id number]).  She may be able to obtain these vaccinations at her local pharmacy OR speak about obtaining them with her primary care.  If she does obtain her vaccines, I have asked Bernadette to let us know the date each vaccine was obtained so that her medical record could be updated in our system.    No orders of the defined types were placed in this encounter.           Return in about 1 year (around 7/5/2025) for Annual physical.    Alexsandra Raymond, MARY  July 5, 2024

## 2024-07-11 LAB — REF LAB TEST METHOD: NORMAL

## 2024-12-05 DIAGNOSIS — F32.1 CURRENT MODERATE EPISODE OF MAJOR DEPRESSIVE DISORDER WITHOUT PRIOR EPISODE: ICD-10-CM

## 2024-12-05 DIAGNOSIS — F41.1 GAD (GENERALIZED ANXIETY DISORDER): ICD-10-CM

## 2024-12-05 NOTE — PROGRESS NOTES
Subjective     Chief Complaint  Med Refill    Subjective          Bernadette Munroe is a 34 y.o. female who presents today to [facility] for follow up.    HPI:   Anxiety        Ms. Munroe is a very pleasant 34 year old female who presents today to  follow up.     Her past medical history includes laryngeal polyps (multiple surgical history) Anxiety/ Depression, prior history of alcohol abuse, sober since 2018.    For her anxiety and depression, she is currently prescribed Prozac 20 mg daily and BuSpar 10 mg TID.  She is still having quite a bit of anxiety. She has stopped BuSpar due to not feeling that it is beneficial for her. She went off of Prozac due to not having a refill and traveling for work. About three weeks ago, she restarted it and is seeing some benefit.  She notes that she could do better about self care. She doesn't make herself a priority. She also just restarted therapy through her employer.     Her last labs were 2/29/24.     The following portions of the patient's history were reviewed and updated as appropriate: allergies, current medications, past family history, past medical history, past social history, past surgical history and problem list.    Objective     Objective     Allergy:   Allergies   Allergen Reactions    Sulfa Antibiotics Unknown - Low Severity and Rash     UNKNOWN-MOTHER TOLD ME        Current Medications:   Current Outpatient Medications   Medication Sig Dispense Refill    FLUoxetine (PROzac) 40 MG capsule Take 1 capsule by mouth Daily. 30 capsule 2    hydrOXYzine pamoate (Vistaril) 25 MG capsule Take 1 capsule by mouth 3 (Three) Times a Day As Needed for Anxiety. 90 capsule 2     No current facility-administered medications for this visit.       Past Medical History:  Past Medical History:   Diagnosis Date    Abnormal Pap smear of cervix     Anxiety     Chlamydia 2006/2007    Depression 2007    History of chlamydia infection     HPV (human  "papilloma virus) infection     Ovarian cyst     Tubal pregnancy        Past Surgical History:  Past Surgical History:   Procedure Laterality Date    DIAGNOSTIC LAPAROSCOPY N/A 7/2/2021    Procedure: DIAGNOSTIC LAPAROSCOPY  Right cornual ectopic wedge resection with mini laparotomy;  Surgeon: Stella Read MD;  Location: [facility];  Service: Obstetrics/Gynecology;  Laterality: N/A;    LAPAROSCOPY W/ MINI-LAPAROTOMY  07/02/2021    for right cornual/interstitial ectopic pregnancy, wedge resection and right salpingectomy     SALPINGECTOMY Right 07/02/2021    for right cornual/interstitial ectopic pregnancy, wedge resection and right salpingectomy     THROAT SURGERY      POLYPS ON VOCAL CORDS     TONSILLECTOMY         Social History:  Social History     Socioeconomic History    Marital status:     Number of children: 1    Highest education level: Some college, no degree   Tobacco Use    Smoking status: Some Days     Types: Electronic Cigarette     Passive exposure: Never    Smokeless tobacco: Never    Tobacco comments:     vapes   Vaping Use    Vaping status: Some Days    Start date: 2/4/2024    Substances: Nicotine, THC, CBD, Flavoring    Devices: Disposable    Passive vaping exposure: Yes   Substance and Sexual Activity    Alcohol use: Not Currently    Drug use: Never     Types: Marijuana    Sexual activity: Yes     Partners: Male     Birth control/protection: Nexplanon       Family History:  Family History   Problem Relation Age of Onset    COPD Mother     COPD Father     Lung disease Maternal Aunt     No Known Problems Maternal Grandmother     Liver cancer Maternal Grandfather     No Known Problems Paternal Grandmother     No Known Problems Paternal Grandfather     Breast cancer Neg Hx     Ovarian cancer Neg Hx     Uterine cancer Neg Hx     Colon cancer Neg Hx     Osteoporosis Neg Hx          Vital Signs:   /68   Pulse 72   Temp 98.6 °F (37 °C) (Infrared)   Ht 157.5 cm (62.01\")   Wt 55.8 kg (123 " None lb)   BMI 22.49 kg/m²      Physical Exam:  Physical Exam  Constitutional:       Appearance: She is not ill-appearing.   Eyes:      Pupils: Pupils are equal, round, and reactive to light.   Cardiovascular:      Rate and Rhythm: Normal rate.      Pulses: Normal pulses.   Pulmonary:      Effort: Pulmonary effort is normal.      Breath sounds: Normal breath sounds.   Neurological:      General: No focal deficit present.      Mental Status: She is alert. Mental status is at baseline.   Psychiatric:         Mood and Affect: Mood normal.         Thought Content: Thought content normal.         Judgment: Judgment normal.                   Lab Review  No visits with results within 3 Month(s) from this visit.   Latest known visit with results is:   Office Visit on 2024   Component Date Value Ref Range Status    Reference Lab Report 2024    Final                    Value:[facility]  [address] [place] [address]  Phone: [phone] or [phone]  Fax: [phone]  Ananda Umana M.D., Medical Director    PATIENT NAME                                     LABORATORY NO.  170   CHRIS WHITMORE                               [id number]  [id number]                                 AGE                    SEX   N              CLIENT REF #  ABELARDO SPEAR APRN MG                     34        1990      F     [id number]      [id number]    GYNECOLOGY                                 REQUESTING M.D.           ATTENDING MJAYLAN.         COPY TO.  [address] [facility] [address]            [place]  [place] [id number]                        DATE COLLECTED            DATE RECEIVED          DATE REPORTED  2024    ThinPrep Pap with Cytyc Imaging    DIAGNOSIS:  Negative for intraepithelial lesion or malignancy    Multiple factors can influence accuracy of Pap                           tests; therefore, screening  at  regular intervals is necessary for early cancer detection.      SPECIMEN ADEQUACY:  SATISFACTORY FOR EVALUATION  Transformation zone is present.  Partially obscuring blood and inflammation are present.  Sparse cellularity, minimal number of squamous cells available for evaluation.  SOURCE OF SPECIMEN:       CERVICAL/ENDOCERVICAL  SLIDES:  1  CLINICAL HISTORY:  Encounter for well woman exam with routine gynecological exam    HPV  HR-HPV POOL: Negative    The Aptima HPV assay is an in vitro nucleic acid amplification test for the  qualitative detection of E6/E7 viral messenger RNA from 14 high risk types of  HPV in cervical specimens. The high risk HPV types detected include: 16, 18,  31, 33, 35, 39, 45, 51, 52, 56, 58, 59, 66, 68    CYTOTECHNOLOGIST:             BASHIR MAN (ASCP)    CPT CODES:  80332, 39415          Radiology Results        Assessment / Plan         Assessment and Plan   Diagnoses and all orders for this visit:    1. MIK (generalized anxiety disorder) (Primary)  Assessment & Plan:  Psychological condition is improving with treatment.  Medication changes per orders.  Psychological condition  will be reassessed in 3 months.    - increase Prozac to 40 mg daily   - Advised to continue therapy sessions   - start Vistaril as needed and at bedtime for anxiety       Orders:  -     FLUoxetine (PROzac) 40 MG capsule; Take 1 capsule by mouth Daily.  Dispense: 30 capsule; Refill: 2  -     hydrOXYzine pamoate (Vistaril) 25 MG capsule; Take 1 capsule by mouth 3 (Three) Times a Day As Needed for Anxiety.  Dispense: 90 capsule; Refill: 2    2. Current moderate episode of major depressive disorder without prior episode  -     FLUoxetine (PROzac) 40 MG capsule; Take 1 capsule by mouth Daily.  Dispense: 30 capsule; Refill: 2  -     hydrOXYzine pamoate (Vistaril) 25 MG capsule; Take 1 capsule by mouth 3 (Three) Times a Day As Needed for Anxiety.  Dispense: 90 capsule; Refill: 2            Discussed possible  differential diagnoses, testing, treatment, recommended non-pharmacological interventions, risks, warning signs to monitor for that would indicate need for follow-up in clinic or ER. If no improvement with these regimens or you have new or worsening symptoms follow-up. Patient verbalizes understanding and agreement with plan of care. Denies further needs or concerns.     Patient was given instructions and counseling regarding her condition and for health maintenance advised.    BMI is within normal parameters. No other follow-up for BMI required.        Health Maintenance  Health Maintenance:   Health Maintenance Due   Topic Date Due    Pneumococcal Vaccine 0-64 (1 of 2 - PCV) Never done          Meds ordered during this visit  New Medications Ordered This Visit   Medications    FLUoxetine (PROzac) 40 MG capsule     Sig: Take 1 capsule by mouth Daily.     Dispense:  30 capsule     Refill:  2    hydrOXYzine pamoate (Vistaril) 25 MG capsule     Sig: Take 1 capsule by mouth 3 (Three) Times a Day As Needed for Anxiety.     Dispense:  90 capsule     Refill:  2       Meds stopped during this visit:  Medications Discontinued During This Encounter   Medication Reason    FLUoxetine (PROzac) 20 MG capsule Reorder    busPIRone (BUSPAR) 10 MG tablet           Visit Diagnoses    ICD-10-CM ICD-9-CM   1. MIK (generalized anxiety disorder)  F41.1 300.02   2. Current moderate episode of major depressive disorder without prior episode  F32.1 296.22           Patient was given instructions and counseling regarding her condition or for health maintenance advice. Please see specific information pulled into the AVS if appropriate.     Follow Up   Return in about 3 months (around 3/6/2025) for followup anxiety.          This document has been electronically signed by Perlita Webster DO   December 6, 2024 10:13 EST    Dictated Utilizing Dragon Dictation: Part of this note may be an electronic transcription/translation of spoken language to  printed text using the Dragon Dictation System.    Perlita Webster D.O.  [facility]   Answers submitted by the patient for this visit:

## 2024-12-06 ENCOUNTER — OFFICE VISIT (OUTPATIENT)
Dept: FAMILY MEDICINE CLINIC | Facility: CLINIC | Age: 34
End: 2024-12-06
Payer: COMMERCIAL

## 2024-12-06 VITALS
HEART RATE: 72 BPM | DIASTOLIC BLOOD PRESSURE: 68 MMHG | TEMPERATURE: 98.6 F | BODY MASS INDEX: 22.63 KG/M2 | SYSTOLIC BLOOD PRESSURE: 106 MMHG | WEIGHT: 123 LBS | HEIGHT: 62 IN

## 2024-12-06 DIAGNOSIS — F32.1 CURRENT MODERATE EPISODE OF MAJOR DEPRESSIVE DISORDER WITHOUT PRIOR EPISODE: ICD-10-CM

## 2024-12-06 DIAGNOSIS — F41.1 GAD (GENERALIZED ANXIETY DISORDER): Primary | ICD-10-CM

## 2024-12-06 PROCEDURE — 99214 OFFICE O/P EST MOD 30 MIN: CPT | Performed by: FAMILY MEDICINE

## 2024-12-06 RX ORDER — FLUOXETINE 40 MG/1
40 CAPSULE ORAL DAILY
Qty: 30 CAPSULE | Refills: 2 | Status: SHIPPED | OUTPATIENT
Start: 2024-12-06

## 2024-12-06 RX ORDER — BUSPIRONE HYDROCHLORIDE 10 MG/1
10 TABLET ORAL 3 TIMES DAILY
Qty: 90 TABLET | Refills: 2 | Status: CANCELLED | OUTPATIENT
Start: 2024-12-06

## 2024-12-06 RX ORDER — HYDROXYZINE PAMOATE 25 MG/1
25 CAPSULE ORAL 3 TIMES DAILY PRN
Qty: 90 CAPSULE | Refills: 2 | Status: SHIPPED | OUTPATIENT
Start: 2024-12-06

## 2024-12-06 NOTE — ASSESSMENT & PLAN NOTE
Psychological condition is improving with treatment.  Medication changes per orders.  Psychological condition  will be reassessed in 3 months.    - increase Prozac to 40 mg daily   - Advised to continue therapy sessions   - start Vistaril as needed and at bedtime for anxiety

## 2025-03-20 ENCOUNTER — OFFICE VISIT (OUTPATIENT)
Dept: FAMILY MEDICINE CLINIC | Facility: CLINIC | Age: 35
End: 2025-03-20
Payer: COMMERCIAL

## 2025-03-20 VITALS
OXYGEN SATURATION: 99 % | SYSTOLIC BLOOD PRESSURE: 100 MMHG | TEMPERATURE: 98.6 F | BODY MASS INDEX: 21.17 KG/M2 | HEART RATE: 71 BPM | WEIGHT: 115.8 LBS | DIASTOLIC BLOOD PRESSURE: 70 MMHG

## 2025-03-20 DIAGNOSIS — F41.1 GAD (GENERALIZED ANXIETY DISORDER): Primary | ICD-10-CM

## 2025-03-20 DIAGNOSIS — F32.1 CURRENT MODERATE EPISODE OF MAJOR DEPRESSIVE DISORDER WITHOUT PRIOR EPISODE: ICD-10-CM

## 2025-03-20 PROCEDURE — 99214 OFFICE O/P EST MOD 30 MIN: CPT | Performed by: FAMILY MEDICINE

## 2025-03-20 RX ORDER — FLUOXETINE HYDROCHLORIDE 40 MG/1
40 CAPSULE ORAL DAILY
Qty: 90 CAPSULE | Refills: 1 | Status: SHIPPED | OUTPATIENT
Start: 2025-03-20

## 2025-03-20 RX ORDER — BUPROPION HYDROCHLORIDE 150 MG/1
150 TABLET ORAL DAILY
Qty: 90 TABLET | Refills: 1 | Status: SHIPPED | OUTPATIENT
Start: 2025-03-20

## 2025-03-20 NOTE — PROGRESS NOTES
Subjective     Chief Complaint  Follow up anxiety (No concerns.)    Subjective          Bernadette Munroe is a 34 y.o. female who presents today to [facility] for follow up.    HPI:   History of Present Illness    History of Present Illness  The patient is a 34-year-old female who presents today for follow-up of anxiety.    She reports experiencing heightened anxiety, which she attributes to an inability to focus due to the overwhelming number of daily tasks. This has led to frequent memory lapses. She describes her mental state as chaotic, with a constant need to keep herself occupied. Despite these challenges, she acknowledges the significant improvement in her condition since starting Prozac and expresses reluctance to increase the dosage. She notes a decrease in emotional outbursts such as crying and believes the medication aids in her cognitive processing. However, she feels more anxious than depressed recently. She has previously tried buspirone but does not recall its efficacy and is currently not on it. She also mentions that she has access to free counseling through her employment but has not yet utilized this resource. She is currently on Prozac 40 mg.    MEDICATIONS  Current: Prozac, Vistaril.  Past: Buspirone.      The following portions of the patient's history were reviewed and updated as appropriate: allergies, current medications, past family history, past medical history, past social history, past surgical history and problem list.    Objective     Objective     Allergy:   Allergies   Allergen Reactions    Sulfa Antibiotics Unknown - Low Severity and Rash     UNKNOWN-MOTHER TOLD ME        Current Medications:   Current Outpatient Medications   Medication Sig Dispense Refill    FLUoxetine (PROzac) 40 MG capsule Take 1 capsule by mouth Daily. 90 capsule 1    hydrOXYzine pamoate (Vistaril) 25 MG capsule Take 1 capsule by mouth 3 (Three) Times a Day As Needed for  Anxiety. 90 capsule 2    buPROPion XL (Wellbutrin XL) 150 MG 24 hr tablet Take 1 tablet by mouth Daily. 90 tablet 1     No current facility-administered medications for this visit.       Past Medical History:  Past Medical History:   Diagnosis Date    Abnormal Pap smear of cervix     Anxiety     Chlamydia 2006/2007    Depression 2007    History of chlamydia infection     HPV (human papilloma virus) infection     Ovarian cyst     Tubal pregnancy        Past Surgical History:  Past Surgical History:   Procedure Laterality Date    DIAGNOSTIC LAPAROSCOPY N/A 7/2/2021    Procedure: DIAGNOSTIC LAPAROSCOPY  Right cornual ectopic wedge resection with mini laparotomy;  Surgeon: Stella Read MD;  Location: [facility];  Service: Obstetrics/Gynecology;  Laterality: N/A;    LAPAROSCOPY W/ MINI-LAPAROTOMY  07/02/2021    for right cornual/interstitial ectopic pregnancy, wedge resection and right salpingectomy     SALPINGECTOMY Right 07/02/2021    for right cornual/interstitial ectopic pregnancy, wedge resection and right salpingectomy     THROAT SURGERY      POLYPS ON VOCAL CORDS     TONSILLECTOMY         Social History:  Social History     Socioeconomic History    Marital status:     Number of children: 1    Highest education level: Some college, no degree   Tobacco Use    Smoking status: Some Days     Types: Electronic Cigarette     Passive exposure: Never    Smokeless tobacco: Never    Tobacco comments:     vapes   Vaping Use    Vaping status: Some Days    Start date: 2/4/2024    Substances: Nicotine, THC, CBD, Flavoring    Devices: Disposable    Passive vaping exposure: Yes   Substance and Sexual Activity    Alcohol use: Not Currently    Drug use: Never     Types: Marijuana    Sexual activity: Yes     Partners: Male     Birth control/protection: Nexplanon       Family History:  Family History   Problem Relation Age of Onset    COPD Mother     COPD Father     Lung disease Maternal Aunt     No Known Problems Maternal  Grandmother     Liver cancer Maternal Grandfather     No Known Problems Paternal Grandmother     No Known Problems Paternal Grandfather     Breast cancer Neg Hx     Ovarian cancer Neg Hx     Uterine cancer Neg Hx     Colon cancer Neg Hx     Osteoporosis Neg Hx          Vital Signs:   /70   Pulse 71   Temp 98.6 °F (37 °C) (Temporal)   Wt 52.5 kg (115 lb 12.8 oz)   SpO2 99%   BMI 21.17 kg/m²      Physical Exam:  Physical Exam  Vitals reviewed.   Constitutional:       Appearance: She is not ill-appearing.   Eyes:      Pupils: Pupils are equal, round, and reactive to light.   Cardiovascular:      Rate and Rhythm: Normal rate.      Pulses: Normal pulses.   Pulmonary:      Effort: Pulmonary effort is normal.      Breath sounds: Normal breath sounds.   Neurological:      General: No focal deficit present.      Mental Status: She is alert. Mental status is at baseline.   Psychiatric:         Mood and Affect: Mood is anxious. Affect is tearful.         Thought Content: Thought content normal.         Judgment: Judgment normal.             PHQ-9 Score  PHQ-9 Total Score:      Lab Review  No visits with results within 3 Month(s) from this visit.   Latest known visit with results is:   Office Visit on 2024   Component Date Value Ref Range Status    Reference Lab Report 2024    Final                    Value:[facility]  [address] [place] [address]  Phone: [phone] or [phone]  Fax: [phone]  Ananda Umana M.D., Medical Director    PATIENT NAME                                     LABORATORY NO.  170   CHRIS WHITMORE                               [id number]  [id number]                                 AGE                    SEX   N              CLIENT REF #  ABELARDO SPEAR APRN [facility]                     34        1990      F     [id number]      [id number]    GYNECOLOGY                                 REQUESTING M.D.           ATTENDING M.D.          COPY TO.  [address]            [place] [address]                        DATE COLLECTED            DATE RECEIVED          DATE REPORTED  07/05/2024 07/05/2024 07/11/2024    ThinPrep Pap with Cytyc Imaging    DIAGNOSIS:  Negative for intraepithelial lesion or malignancy    Multiple factors can influence accuracy of Pap                           tests; therefore, screening at  regular intervals is necessary for early cancer detection.      SPECIMEN ADEQUACY:  SATISFACTORY FOR EVALUATION  Transformation zone is present.  Partially obscuring blood and inflammation are present.  Sparse cellularity, minimal number of squamous cells available for evaluation.  SOURCE OF SPECIMEN:       CERVICAL/ENDOCERVICAL  SLIDES:  1  CLINICAL HISTORY:  Encounter for well woman exam with routine gynecological exam    HPV  HR-HPV POOL: Negative    The Aptima HPV assay is an in vitro nucleic acid amplification test for the  qualitative detection of E6/E7 viral messenger RNA from 14 high risk types of  HPV in cervical specimens. The high risk HPV types detected include: 16, 18,  31, 33, 35, 39, 45, 51, 52, 56, 58, 59, 66, 68    CYTOTECHNOLOGIST:             BASHIR MAN (ASCP)    CPT CODES:  71110, 36443          Radiology Results        Assessment / Plan         Assessment and Plan   Diagnoses and all orders for this visit:    1. MIK (generalized anxiety disorder) (Primary)  -     FLUoxetine (PROzac) 40 MG capsule; Take 1 capsule by mouth Daily.  Dispense: 90 capsule; Refill: 1  -     buPROPion XL (Wellbutrin XL) 150 MG 24 hr tablet; Take 1 tablet by mouth Daily.  Dispense: 90 tablet; Refill: 1  -     Ambulatory Referral to Behavioral Health    2. Current moderate episode of major depressive disorder without prior episode  -     FLUoxetine (PROzac) 40 MG capsule; Take 1 capsule by mouth Daily.  Dispense: 90 capsule; Refill: 1  -     buPROPion XL (Wellbutrin XL) 150 MG 24 hr  tablet; Take 1 tablet by mouth Daily.  Dispense: 90 tablet; Refill: 1  -     Ambulatory Referral to Behavioral Health        Assessment & Plan  1. Anxiety.  Her anxiety levels remain elevated despite being on the maximum dosage of Prozac at 40 mg. She reports difficulty focusing and retaining information, feeling overwhelmed by daily activities, and experiencing frequent forgetfulness. She has previously tried buspirone but does not recall its effectiveness and is not currently taking it. She is advised to continue using Vistaril as needed for severe anxiety episodes. A referral for counseling has been initiated to address her anxiety and potential underlying trauma. Wellbutrin will be added to her regimen to help with clarity and focus, which can be taken once daily at the same time as Prozac. She is encouraged to explore breathing exercises, including box breathing and vagus nerve reset, available on YouTube.    Follow-up  The patient will follow up in 3 months.      Discussed possible differential diagnoses, testing, treatment, recommended non-pharmacological interventions, risks, warning signs to monitor for that would indicate need for follow-up in clinic or ER. If no improvement with these regimens or you have new or worsening symptoms follow-up. Patient verbalizes understanding and agreement with plan of care. Denies further needs or concerns.     Patient was given instructions and counseling regarding her condition and for health maintenance advised.    BMI is within normal parameters. No other follow-up for BMI required.        Health Maintenance  Health Maintenance:   Health Maintenance Due   Topic Date Due    Pneumococcal Vaccine 0-49 (1 of 2 - PCV) Never done    COVID-19 Vaccine (4 - 2024-25 season) 09/01/2024    ANNUAL PHYSICAL  02/28/2025        Meds ordered during this visit  New Medications Ordered This Visit   Medications    FLUoxetine (PROzac) 40 MG capsule     Sig: Take 1 capsule by mouth Daily.      Dispense:  90 capsule     Refill:  1    buPROPion XL (Wellbutrin XL) 150 MG 24 hr tablet     Sig: Take 1 tablet by mouth Daily.     Dispense:  90 tablet     Refill:  1       Meds stopped during this visit:  Medications Discontinued During This Encounter   Medication Reason    FLUoxetine (PROzac) 40 MG capsule Reorder        Visit Diagnoses    ICD-10-CM ICD-9-CM   1. MIK (generalized anxiety disorder)  F41.1 300.02   2. Current moderate episode of major depressive disorder without prior episode  F32.1 296.22       Patient was given instructions and counseling regarding her condition or for health maintenance advice. Please see specific information pulled into the AVS if appropriate.     Follow Up   Return in about 3 months (around 6/20/2025) for followup Anxiety/Depression .      Patient or patient representative verbalized consent for the use of Ambient Listening during the visit with  Perlita Webster DO for chart documentation. 3/20/2025  08:28 EDT      This document has been electronically signed by Perlita Webster DO   March 20, 2025 08:36 EDT    Dictated Utilizing Dragon Dictation: Part of this note may be an electronic transcription/translation of spoken language to printed text using the Dragon Dictation System.    Perlita Webster D.O.  [facility] [place]

## 2025-04-23 ENCOUNTER — PRIOR AUTHORIZATION (OUTPATIENT)
Dept: PSYCHIATRY | Facility: CLINIC | Age: 35
End: 2025-04-23

## 2025-04-23 ENCOUNTER — TELEMEDICINE (OUTPATIENT)
Dept: PSYCHIATRY | Facility: CLINIC | Age: 35
End: 2025-04-23
Payer: COMMERCIAL

## 2025-04-23 DIAGNOSIS — F31.60 BIPOLAR 1 DISORDER, MIXED: Primary | ICD-10-CM

## 2025-04-23 DIAGNOSIS — F41.1 GENERALIZED ANXIETY DISORDER: ICD-10-CM
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
## 2025-05-14 ENCOUNTER — TELEMEDICINE (OUTPATIENT)
Dept: PSYCHIATRY | Facility: CLINIC | Age: 35
End: 2025-05-14
Payer: COMMERCIAL

## 2025-05-14 DIAGNOSIS — F31.60 BIPOLAR 1 DISORDER, MIXED: Primary | ICD-10-CM

## 2025-05-14 DIAGNOSIS — F41.1 GAD (GENERALIZED ANXIETY DISORDER): ICD-10-CM

## 2025-05-14 RX ORDER — ARIPIPRAZOLE 5 MG/1
5 TABLET ORAL DAILY
Qty: 30 TABLET | Refills: 0 | Status: SHIPPED | OUTPATIENT
Start: 2025-05-14

## 2025-05-14 NOTE — PROGRESS NOTES
This provider is located at [facility], [address] [place] [address].The Patient is seen remotely at work 's office in [facility] via my chart.  Patient is being seen via telehealth and confirm that they are in a secure environment for this session. The patient's condition being diagnosed/treated is appropriate for telemedicine. The provider identified himself/herself: herself as well as her credentials.   The patient gave consent to be seen remotely, and when consent is given they understand that the consent allows for patient identifiable information to be sent to a third party as needed.   They may refuse to be seen remotely at any time. The electronic data is encrypted and password protected, and the patient has been advised of the potential risks to privacy not withstanding such measures.    You have chosen to receive care through a telehealth visit.  Do you consent to use a video/audio connection for your medical care today? Yes. Patient verified Name, , and address.       Chief Complaint  Mood and anxiety    Subjective          Bernadette Munroe presents to [facility] for medication management.    History of Present Illness  Patient presents today reporting that things have been somewhat up and down.  She notes the Vraylar she is seeing an improvement however she still feels very fast and is hard to slow down at times.  Patient notes however the past 1 to 2 weeks she has noticed that midsentence she will forget what she is saying and have brain fog.  Patient states she has not had that before.  Notes being off the Prozac has been fine.  Denies feeling depressed or hopeless or helpless.  States her sleep is up and down at times but averaging 5 to 6 hours.  States her appetite is coming back.  Patient notes she has a lot going on personally as her and her  are  and she is moving into a new  apartment but also doing marriage counseling through [employer].  Patient feels her anxiety is high because she is hyperactive and restless and agitated at times and feels as if she cannot slow down.  Denies any SI/HI/AH/VH.         Objective   Vital Signs:   There were no vitals taken for this visit.  Due to the remote nature of this encounter (virtual encounter), vitals were unable to be obtained.  Height stated at 62 inches.  Weight stated at 115 pounds.      PHQ-9 Score:   PHQ-9 Total Score:(Patient-Rptd) 18     PHQ-9 Depression Screening  Little interest or pleasure in doing things? (Patient-Rptd) Several days   Feeling down, depressed, or hopeless? (Patient-Rptd) Not at all   PHQ-2 Total Score (Patient-Rptd) 1   Trouble falling or staying asleep, or sleeping too much? (Patient-Rptd) Almost all   Feeling tired or having little energy? (Patient-Rptd) Almost all   Poor appetite or overeating? (Patient-Rptd) Almost all   Feeling bad about yourself - or that you are a failure or have let yourself or your family down? (Patient-Rptd) Over half   Trouble concentrating on things, such as reading the newspaper or watching television? (Patient-Rptd) Almost all   Moving or speaking so slowly that other people could have noticed? Or the opposite - being so fidgety or restless that you have been moving around a lot more than usual? (Patient-Rptd) Almost all   Thoughts that you would be better off dead, or of hurting yourself in some way? (Patient-Rptd) Not at all   PHQ-9 Total Score (Patient-Rptd) 18   If you checked off any problems, how difficult have these problems made it for you to do your work, take care of things at home, or get along with other people? (Patient-Rptd) Extremely difficult         PHQ-9 Total Score: (Patient-Rptd) 18     MIK-7  Feeling nervous, anxious or on edge: (Patient-Rptd) Nearly every day  Not being able to stop or control worrying: (Patient-Rptd) Nearly every day  Worrying too much about different  things: (Patient-Rptd) Nearly every day  Trouble Relaxing: (Patient-Rptd) Nearly every day  Being so restless that it is hard to sit still: (Patient-Rptd) Nearly every day  Feeling afraid as if something awful might happen: (Patient-Rptd) Several days  Becoming easily annoyed or irritable: (Patient-Rptd) Nearly every day  MIK 7 Total Score: (Patient-Rptd) 19  If you checked any problems, how difficult have these problems made it for you to do your work, take care of things at home, or get along with other people: (Patient-Rptd) Very difficult      Patient screened positive for depression based on a PHQ-9 score of 18 on 5/12/2025. Follow-up recommendations include:  See notes and medication list .        Mental Status Exam:   Hygiene:   good  Cooperation:  Cooperative  Eye Contact:  Good  Psychomotor Behavior:  Restless  Affect:  Appropriate  Mood: normal and euthymic  Speech:  Normal and Pressured  Thought Process:  Goal directed  Thought Content:  Normal  Suicidal:  None  Homicidal:  None  Hallucinations:  None  Delusion:  None  Memory:  Intact  Orientation:  Person, Place, Time, and Situation  Reliability:  good  Insight:  Good  Judgement:  Good and Fair  Impulse Control:  Good and Fair  Physical/Medical Issues:  Yes see medical hx      Current Medications:   Current Outpatient Medications   Medication Sig Dispense Refill    ARIPiprazole (ABILIFY) 5 MG tablet Take 1 tablet by mouth Daily. 30 tablet 0    buPROPion XL (Wellbutrin XL) 150 MG 24 hr tablet Take 1 tablet by mouth Daily. 90 tablet 1    hydrOXYzine pamoate (Vistaril) 25 MG capsule Take 1 capsule by mouth 3 (Three) Times a Day As Needed for Anxiety. (Patient not taking: Reported on 4/23/2025) 90 capsule 2     No current facility-administered medications for this visit.       Physical Exam  Nursing note reviewed.   Constitutional:       Appearance: Normal appearance.   Neurological:      Mental Status: She is alert.   Psychiatric:         Attention and  Perception: Attention and perception normal.         Mood and Affect: Affect normal. Mood is anxious.         Speech: Speech is rapid and pressured.         Behavior: Behavior is hyperactive. Behavior is cooperative.         Thought Content: Thought content normal.         Cognition and Memory: Cognition and memory normal.        Result Review :     The following data was reviewed by: MARY Valencia on 05/14/2025:                                        Data reviewed : PCP notes         Assessment and Plan    Problem List Items Addressed This Visit       MIK (generalized anxiety disorder)    Relevant Medications    ARIPiprazole (ABILIFY) 5 MG tablet     Other Visit Diagnoses         Bipolar 1 disorder, mixed    -  Primary    Relevant Medications    ARIPiprazole (ABILIFY) 5 MG tablet            Encounter Diagnoses   Name Primary?    MIK (generalized anxiety disorder)     Bipolar 1 disorder, mixed Yes          TREATMENT PLAN/GOALS: Continue supportive psychotherapy efforts and medications as indicated. Treatment and medication options discussed during today's visit. Patient ackowledged and verbally consented to continue with current treatment plan and was educated on the importance of compliance with treatment and follow-up appointments.    MEDICATION ISSUES:  We discussed risks, benefits, and side effects of the above medications and the patient was agreeable with the plan. Patient was educated on the importance of compliance with treatment and follow-up appointments.  Patient is agreeable to call the office with any worsening of symptoms or onset of side effects. Patient is agreeable to call 911 or go to the nearest ER should he/she begin having SI/HI.     - Discontinue Vraylar as it caused memory fog and forgetfulness.  -Began aripiprazole 5 mg daily now for bipolar symptoms.  Encouraged patient if it made her drowsy take at night.  Encouraged patient any worsening symptoms significant side effects or  tremors to discontinue contact clinic for sooner appointment or try an alternative patient verbalized understanding.  -Continue Wellbutrin 150 mg XL daily for focus we will look at increasing at next visit if tolerating other medications well.  -Therapy appointment set for in July     Counseled patient regarding multimodal approach with healthy nutrition, healthy sleep, regular physical activity, social activities, counseling, and medications.      Coping skills reviewed and encouraged positive framing of thoughts     Assisted patient in processing above session content; acknowledged and normalized patient’s thoughts, feelings, and concerns.  Applied  positive coping skills and behavior management in session.  Allowed patient to freely discuss issues without interruption or judgment. Provided safe, confidential environment to facilitate the development of positive therapeutic relationship and encourage open, honest communication. Assisted patient in identifying risk factors which would indicate the need for higher level of care including thoughts to harm self or others and/or self-harming behavior and encouraged patient to contact this office, call 911, or present to the nearest emergency room should any of these events occur. Discussed crisis intervention services and means to access.     MEDS ORDERED DURING VISIT:  New Medications Ordered This Visit   Medications    ARIPiprazole (ABILIFY) 5 MG tablet     Sig: Take 1 tablet by mouth Daily.     Dispense:  30 tablet     Refill:  0           Follow Up   Return in about 4 weeks (around 6/11/2025), or if symptoms worsen or fail to improve, for Recheck.    Patient was given instructions and counseling regarding her condition or for health maintenance advice. Please see specific information pulled into the AVS if appropriate.     Some of the data in this electronic note has been brought forward from a previous encounter, any necessary changes have been made, it has been  reviewed by this APRN, and it is accurate.      This document has been electronically signed by MARY Valencia  May 14, 2025 15:57 EDT    Part of this note may be an electronic transcription/translation of spoken language to printed text using the Dragon Dictation System.

## 2025-05-29 ENCOUNTER — TELEPHONE (OUTPATIENT)
Dept: PSYCHIATRY | Facility: CLINIC | Age: 35
End: 2025-05-29
Payer: COMMERCIAL

## 2025-05-29 RX ORDER — QUETIAPINE FUMARATE 50 MG/1
50 TABLET, FILM COATED ORAL NIGHTLY
Qty: 30 TABLET | Refills: 0 | Status: SHIPPED | OUTPATIENT
Start: 2025-05-29

## 2025-05-29 NOTE — TELEPHONE ENCOUNTER
Pt states that she is still feeling spacey and can't focus and forgetful after starting the Abilify.

## 2025-05-29 NOTE — TELEPHONE ENCOUNTER
Okay stop Abilify. Start Seroquel 50mg at night can take half tab if needed if too drowsy or side effects please call clinic and let me know. Then we can do an appeal with insurance and try different options.

## 2025-06-17 ENCOUNTER — TELEMEDICINE (OUTPATIENT)
Dept: PSYCHIATRY | Facility: CLINIC | Age: 35
End: 2025-06-17
Payer: COMMERCIAL

## 2025-06-17 DIAGNOSIS — F31.60 BIPOLAR 1 DISORDER, MIXED: Primary | Chronic | ICD-10-CM

## 2025-06-17 DIAGNOSIS — F41.1 GAD (GENERALIZED ANXIETY DISORDER): Chronic | ICD-10-CM

## 2025-06-17 RX ORDER — LAMOTRIGINE 25 MG/1
TABLET ORAL
Qty: 45 TABLET | Refills: 1 | Status: SHIPPED | OUTPATIENT
Start: 2025-06-17

## 2025-06-17 RX ORDER — QUETIAPINE FUMARATE 50 MG/1
50 TABLET, FILM COATED ORAL NIGHTLY
Qty: 30 TABLET | Refills: 2 | Status: SHIPPED | OUTPATIENT
Start: 2025-06-17

## 2025-06-17 RX ORDER — BUPROPION HYDROCHLORIDE 300 MG/1
300 TABLET ORAL DAILY
Qty: 30 TABLET | Refills: 2 | Status: SHIPPED | OUTPATIENT
Start: 2025-06-17

## 2025-06-17 NOTE — PROGRESS NOTES
This provider is located at [facility], [address] [place] [address].The Patient is seen remotely at work 's office in [facility] via my chart.  Patient is being seen via telehealth and confirm that they are in a secure environment for this session. The patient's condition being diagnosed/treated is appropriate for telemedicine. The provider identified himself/herself: herself as well as her credentials.   The patient gave consent to be seen remotely, and when consent is given they understand that the consent allows for patient identifiable information to be sent to a third party as needed.   They may refuse to be seen remotely at any time. The electronic data is encrypted and password protected, and the patient has been advised of the potential risks to privacy not withstanding such measures.    You have chosen to receive care through a telehealth visit.  Do you consent to use a video/audio connection for your medical care today? Yes. Patient verified Name, , and address. No changes noted      Chief Complaint  Mood and anxiety    Subjective    Bernadette KEYA Munroe presents to [facility] for medication management.    History of Present Illness  Patient notes things have been better but she is still struggling with her focus and sleep.  She states the Seroquel puts her to sleep but it has helped her stay asleep.  She states that she gets 3 to 4 hours and then is up every hour.  She states she is able to go back to sleep but she knows she will be up the next hour.  States she does wake up feeling slightly tired.  Denies feeling depressed or hopeless or helpless.  However feels her thoughts are still manic.  She states she likes to make list and organize things and that is all she thinks about and she is driven to get those done but once they are done and she is unsure what to do and more hyper focused on getting it  done.  Patient states the anxiety is constant and the irritability is on and off.  Patient notes for example she was just ready to get to work and she pulled into her parking space and was getting her stuff before she even put her car in park.  Patient states that she is still currently  and working with the therapist at the [facility] but needs to make a decision on what she wants to do.  Denies any side effects to medications.  Denies any SI/HI/AH/VH.        Objective   Vital Signs:   There were no vitals taken for this visit.  Due to the remote nature of this encounter (virtual encounter), vitals were unable to be obtained.  Height stated at 62 inches.  Weight stated at 115 pounds.      PHQ-9 Score:   PHQ-9 Total Score:(Patient-Rptd) 7     PHQ-9 Depression Screening  Little interest or pleasure in doing things? (Patient-Rptd) Not at all   Feeling down, depressed, or hopeless? (Patient-Rptd) Not at all   PHQ-2 Total Score (Patient-Rptd) 0   Trouble falling or staying asleep, or sleeping too much? (Patient-Rptd) Over half   Feeling tired or having little energy? (Patient-Rptd) Several days   Poor appetite or overeating? (Patient-Rptd) Over half   Feeling bad about yourself - or that you are a failure or have let yourself or your family down? (Patient-Rptd) Not at all   Trouble concentrating on things, such as reading the newspaper or watching television? (Patient-Rptd) Several days   Moving or speaking so slowly that other people could have noticed? Or the opposite - being so fidgety or restless that you have been moving around a lot more than usual? (Patient-Rptd) Several days   Thoughts that you would be better off dead, or of hurting yourself in some way? (Patient-Rptd) Not at all   PHQ-9 Total Score (Patient-Rptd) 7   If you checked off any problems, how difficult have these problems made it for you to do your work, take care of things at home, or get along with other people? (Patient-Rptd) Somewhat  difficult         PHQ-9 Total Score: (Patient-Rptd) 7     MIK-7  Feeling nervous, anxious or on edge: (Patient-Rptd) Several days  Not being able to stop or control worrying: (Patient-Rptd) Several days  Worrying too much about different things: (Patient-Rptd) Several days  Trouble Relaxing: (Patient-Rptd) Nearly every day  Being so restless that it is hard to sit still: (Patient-Rptd) Several days  Feeling afraid as if something awful might happen: (Patient-Rptd) Several days  Becoming easily annoyed or irritable: (Patient-Rptd) More than half the days  MIK 7 Total Score: (Patient-Rptd) 10  If you checked any problems, how difficult have these problems made it for you to do your work, take care of things at home, or get along with other people: (Patient-Rptd) Somewhat difficult      Patient screened positive for depression based on a PHQ-9 score of 7 on 6/17/2025. Follow-up recommendations include: See notes and medication list.        Mental Status Exam:   Hygiene:   good  Cooperation:  Cooperative  Eye Contact:  Good  Psychomotor Behavior:  Restless  Affect:  Appropriate  Mood: euthymic and anxious  Speech:  Normal and Pressured  Thought Process:  Goal directed  Thought Content:  Normal  Suicidal:  None  Homicidal:  None  Hallucinations:  None  Delusion:  None  Memory:  Intact  Orientation:  Person, Place, Time, and Situation  Reliability:  good  Insight:  Good  Judgement:  Good and Fair  Impulse Control:  Good and Fair  Physical/Medical Issues:  Yes see medical hx     Current Medications:   Current Outpatient Medications   Medication Sig Dispense Refill    buPROPion XL (Wellbutrin XL) 300 MG 24 hr tablet Take 1 tablet by mouth Daily. 30 tablet 2    QUEtiapine (SEROquel) 50 MG tablet Take 1 tablet by mouth Every Night. 30 tablet 2    hydrOXYzine pamoate (Vistaril) 25 MG capsule Take 1 capsule by mouth 3 (Three) Times a Day As Needed for Anxiety. (Patient not taking: Reported on 4/23/2025) 90 capsule 2     lamoTRIgine (LaMICtal) 25 MG tablet Take 1 tablet for 2 weeks if no rash or side effects then take 2 tablets daily. 45 tablet 1     No current facility-administered medications for this visit.       Physical Exam  Nursing note reviewed.   Constitutional:       Appearance: Normal appearance.   Neurological:      Mental Status: She is alert.   Psychiatric:         Attention and Perception: Attention and perception normal.         Mood and Affect: Mood is anxious.         Speech: Speech is not rapid and pressured.         Behavior: Behavior is agitated and hyperactive. Behavior is cooperative.         Thought Content: Thought content normal.         Cognition and Memory: Cognition and memory normal.        Result Review :     The following data was reviewed by: MARY Valencia on 05/14/2025:                                        Data reviewed : PCP notes        Assessment and Plan    Problem List Items Addressed This Visit       MIK (generalized anxiety disorder)    Relevant Medications    QUEtiapine (SEROquel) 50 MG tablet    buPROPion XL (Wellbutrin XL) 300 MG 24 hr tablet     Other Visit Diagnoses         Bipolar 1 disorder, mixed  (Chronic)   -  Primary    Relevant Medications    QUEtiapine (SEROquel) 50 MG tablet    buPROPion XL (Wellbutrin XL) 300 MG 24 hr tablet    lamoTRIgine (LaMICtal) 25 MG tablet              Encounter Diagnoses   Name Primary?    Bipolar 1 disorder, mixed Yes    MIK (generalized anxiety disorder)         I have reviewed the patient and the results are consistent with the previously documented exam by myself.  I have reviewed the following portions of the patient's ROS and PFSH and confirmed them as accurate.  The HPI has been updated, chief of complaint, ROS and subjective have been reviewed and up-to-date.  The following portions of the patient's notes were reviewed, confirmed and/or updated this visit as appropriate: History of present illness/Interval history, physical  examination, assessment and plan, allergies, current medications, past family medical history, past medical history, past social history, past surgical history and problem list.      TREATMENT PLAN/GOALS: Continue supportive psychotherapy efforts and medications as indicated. Treatment and medication options discussed during today's visit. Patient ackowledged and verbally consented to continue with current treatment plan and was educated on the importance of compliance with treatment and follow-up appointments.    MEDICATION ISSUES:  We discussed risks, benefits, and side effects of the above medications and the patient was agreeable with the plan. Patient was educated on the importance of compliance with treatment and follow-up appointments.  Patient is agreeable to call the office with any worsening of symptoms or onset of side effects. Patient is agreeable to call 911 or go to the nearest ER should he/she begin having SI/HI. We will add Lamictal in an effort to stabilize mood.  The patient was reminded to immediately come to the hospital should there be any loss of control.  Explanation was given to her regarding Lamictal and the potential for Dawson Gagan syndrome and significant rash.  Patient was encouraged to check skin prior to beginning.  Patient was encouraged to report any rash and to immediately stop medication.      - Continue Seroquel 50 mg at night for sleep and bipolar disorder.  Encouraged patient that she can try melatonin or magnesium gylincate OTC with the Seroquel for her sleep to see what is better effective.  Encouraged her we could go to 75 mg to see if that helps with her sleep but do not want to make her too drowsy during the day she verbalized understanding.  - Increase Wellbutrin to 300 mg XL daily as adjunct for focus and attention.  -Begin Lamictal 25 mg daily for 2 weeks if no side effects or rash then begin taking 2 tablets totaling 50 mg daily for bipolar disorder.  Highly  encouraged the patient if any significant side effects or rash or worsening symptoms to discontinue contact clinic and/or go to nearest ER she verbalized understanding.     Counseled patient regarding multimodal approach with healthy nutrition, healthy sleep, regular physical activity, social activities, counseling, and medications.      Coping skills reviewed and encouraged positive framing of thoughts     Assisted patient in processing above session content; acknowledged and normalized patient’s thoughts, feelings, and concerns.  Applied  positive coping skills and behavior management in session.  Allowed patient to freely discuss issues without interruption or judgment. Provided safe, confidential environment to facilitate the development of positive therapeutic relationship and encourage open, honest communication. Assisted patient in identifying risk factors which would indicate the need for higher level of care including thoughts to harm self or others and/or self-harming behavior and encouraged patient to contact this office, call 911, or present to the nearest emergency room should any of these events occur. Discussed crisis intervention services and means to access.     MEDS ORDERED DURING VISIT:  New Medications Ordered This Visit   Medications    QUEtiapine (SEROquel) 50 MG tablet     Sig: Take 1 tablet by mouth Every Night.     Dispense:  30 tablet     Refill:  2    buPROPion XL (Wellbutrin XL) 300 MG 24 hr tablet     Sig: Take 1 tablet by mouth Daily.     Dispense:  30 tablet     Refill:  2    lamoTRIgine (LaMICtal) 25 MG tablet     Sig: Take 1 tablet for 2 weeks if no rash or side effects then take 2 tablets daily.     Dispense:  45 tablet     Refill:  1           Follow Up   Return in about 4 weeks (around 7/15/2025), or if symptoms worsen or fail to improve, for Recheck.    Patient was given instructions and counseling regarding her condition or for health maintenance advice. Please see specific  information pulled into the AVS if appropriate.     Some of the data in this electronic note has been brought forward from a previous encounter, any necessary changes have been made, it has been reviewed by this APRN, and it is accurate.      This document has been electronically signed by MARY Valencia  June 17, 2025 13:18 EDT    Part of this note may be an electronic transcription/translation of spoken language to printed text using the Dragon Dictation System.

## 2025-07-08 ENCOUNTER — TELEMEDICINE (OUTPATIENT)
Dept: PSYCHIATRY | Facility: CLINIC | Age: 35
End: 2025-07-08
Payer: COMMERCIAL

## 2025-07-08 DIAGNOSIS — F41.1 GENERALIZED ANXIETY DISORDER: ICD-10-CM

## 2025-07-08 DIAGNOSIS — F31.60 BIPOLAR 1 DISORDER, MIXED: Primary | ICD-10-CM
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
## 2025-07-17 ENCOUNTER — TELEMEDICINE (OUTPATIENT)
Dept: PSYCHIATRY | Facility: CLINIC | Age: 35
End: 2025-07-17
Payer: COMMERCIAL

## 2025-07-17 DIAGNOSIS — F41.1 GAD (GENERALIZED ANXIETY DISORDER): Chronic | ICD-10-CM

## 2025-07-17 DIAGNOSIS — F31.60 BIPOLAR 1 DISORDER, MIXED: Chronic | ICD-10-CM

## 2025-07-17 RX ORDER — BUPROPION HYDROCHLORIDE 300 MG/1
300 TABLET ORAL DAILY
Qty: 30 TABLET | Refills: 2 | Status: SHIPPED | OUTPATIENT
Start: 2025-07-17

## 2025-07-17 RX ORDER — LAMOTRIGINE 25 MG/1
50 TABLET ORAL DAILY
Qty: 60 TABLET | Refills: 1 | Status: SHIPPED | OUTPATIENT
Start: 2025-07-17

## 2025-07-17 RX ORDER — QUETIAPINE FUMARATE 50 MG/1
50 TABLET, FILM COATED ORAL NIGHTLY
Qty: 30 TABLET | Refills: 2 | Status: SHIPPED | OUTPATIENT
Start: 2025-07-17

## 2025-07-17 NOTE — PROGRESS NOTES
This provider is located at [facility], [address] [place] [address].The Patient is seen remotely at work 's office in [facility] via my chart.  Patient is being seen via telehealth and confirm that they are in a secure environment for this session. The patient's condition being diagnosed/treated is appropriate for telemedicine. The provider identified himself/herself: herself as well as her credentials.   The patient gave consent to be seen remotely, and when consent is given they understand that the consent allows for patient identifiable information to be sent to a third party as needed.   They may refuse to be seen remotely at any time. The electronic data is encrypted and password protected, and the patient has been advised of the potential risks to privacy not withstanding such measures.    You have chosen to receive care through a telehealth visit.  Do you consent to use a video/audio connection for your medical care today? Yes. Patient verified Name, , and address.  No changes noted      Chief Complaint  Mood and anxiety    Subjective    Bernadette Munroe presents to [facility] for medication management.    History of Present Illness  The patient presents today reporting that she did not fill out the questionnaires as she has been very busy with work.  Patient states that she has been doing okay but she forgot to take 2 of the Lamictal so she has only been taking 1 tablet and denies any side effects or rash.  She states she has noticed her mind slightly slowing down however she still struggles with her racing thoughts and decreased focus at times.  Patient states however she still has a lot going on with the separation with her  and now her daughter.  She states her daughter was at camp and one of the camp counselors came to her and telling her she needs to get in therapy but now was not sure if she  was harming herself or anything else and has talked with her daughter and possibly getting her into counseling.  She states that she also has no attraction to her  mail so she has felt numb with that in regards to her marriage but does not necessarily know or feel if the medication is causing that.  Reports she did meet with the therapist and does have scheduled appointments.  Patient states things have been overall quiet but feels she is processing a lot.  States she is sleeping well and appetite is good and denies any impulsive behaviors.  However notes her mind is so fast that she just cannot seem to slow down at times.  Denies any hypomanic or manic episodes.  Denies any SI/HI/AH/VH.        Objective   Vital Signs:   There were no vitals taken for this visit.  Due to the remote nature of this encounter (virtual encounter), vitals were unable to be obtained.  Height stated at 62 inches.  Weight stated at 115 pounds.      PHQ-9 Score:   PHQ-9 Total Score:      PHQ-9 Depression Screening  Little interest or pleasure in doing things?     Feeling down, depressed, or hopeless?     PHQ-2 Total Score     Trouble falling or staying asleep, or sleeping too much?     Feeling tired or having little energy?     Poor appetite or overeating?     Feeling bad about yourself - or that you are a failure or have let yourself or your family down?     Trouble concentrating on things, such as reading the newspaper or watching television?     Moving or speaking so slowly that other people could have noticed? Or the opposite - being so fidgety or restless that you have been moving around a lot more than usual?     Thoughts that you would be better off dead, or of hurting yourself in some way?     PHQ-9 Total Score     If you checked off any problems, how difficult have these problems made it for you to do your work, take care of things at home, or get along with other people?           PHQ-9 Total Score:       MIK-7          Patient screened positive for depression based on a PHQ-9 score of 9 on 7/7/2025. Follow-up recommendations include: See notes and medication list.        Mental Status Exam:   Hygiene:   good  Cooperation:  Cooperative  Eye Contact:  Good  Psychomotor Behavior:  Restless  Affect:  Appropriate  Mood: normal, sad, and anxious  Speech:  Normal and Pressured  Thought Process:  Goal directed  Thought Content:  Normal  Suicidal:  None  Homicidal:  None  Hallucinations:  None  Delusion:  None  Memory:  Intact  Orientation:  Person, Place, Time, and Situation  Reliability:  good  Insight:  Good  Judgement:  Good and Fair  Impulse Control:  Good and Fair  Physical/Medical Issues:  Yes see medical hx     Current Medications:   Current Outpatient Medications   Medication Sig Dispense Refill    buPROPion XL (Wellbutrin XL) 300 MG 24 hr tablet Take 1 tablet by mouth Daily. 30 tablet 2    lamoTRIgine (LaMICtal) 25 MG tablet Take 2 tablets by mouth Daily. 60 tablet 1    QUEtiapine (SEROquel) 50 MG tablet Take 1 tablet by mouth Every Night. 30 tablet 2    hydrOXYzine pamoate (Vistaril) 25 MG capsule Take 1 capsule by mouth 3 (Three) Times a Day As Needed for Anxiety. (Patient not taking: Reported on 4/23/2025) 90 capsule 2     No current facility-administered medications for this visit.       Physical Exam  Nursing note reviewed.   Constitutional:       Appearance: Normal appearance.   Neurological:      Mental Status: She is alert.   Psychiatric:         Attention and Perception: Attention and perception normal.         Mood and Affect: Mood is anxious.         Speech: Speech is not rapid and pressured.         Behavior: Behavior is agitated. Behavior is not hyperactive. Behavior is cooperative.         Thought Content: Thought content normal.         Cognition and Memory: Cognition and memory normal.        Result Review :     The following data was reviewed by: MARY Valencia on  05/14/2025:                                        Data reviewed : PCP notes        Assessment and Plan    Problem List Items Addressed This Visit       MIK (generalized anxiety disorder)    Relevant Medications    buPROPion XL (Wellbutrin XL) 300 MG 24 hr tablet    QUEtiapine (SEROquel) 50 MG tablet     Other Visit Diagnoses         Bipolar 1 disorder, mixed  (Chronic)       Relevant Medications    lamoTRIgine (LaMICtal) 25 MG tablet    buPROPion XL (Wellbutrin XL) 300 MG 24 hr tablet    QUEtiapine (SEROquel) 50 MG tablet                Encounter Diagnoses   Name Primary?    Bipolar 1 disorder, mixed     MIK (generalized anxiety disorder)         I have reviewed the patient and the results are consistent with the previously documented exam by myself.  I have reviewed the following portions of the patient's ROS and PFSH and confirmed them as accurate.  The HPI has been updated, chief of complaint, ROS and subjective have been reviewed and up-to-date.  The following portions of the patient's notes were reviewed, confirmed and/or updated this visit as appropriate: History of present illness/Interval history, physical examination, assessment and plan, allergies, current medications, past family medical history, past medical history, past social history, past surgical history and problem list.        TREATMENT PLAN/GOALS: Continue supportive psychotherapy efforts and medications as indicated. Treatment and medication options discussed during today's visit. Patient ackowledged and verbally consented to continue with current treatment plan and was educated on the importance of compliance with treatment and follow-up appointments.    MEDICATION ISSUES:  We discussed risks, benefits, and side effects of the above medications and the patient was agreeable with the plan. Patient was educated on the importance of compliance with treatment and follow-up appointments.  Patient is agreeable to call the office with any worsening  of symptoms or onset of side effects. Patient is agreeable to call 911 or go to the nearest ER should he/she begin having SI/HI. We will add Lamictal in an effort to stabilize mood.  The patient was reminded to immediately come to the hospital should there be any loss of control.  Explanation was given to her regarding Lamictal and the potential for Dawson Gagan syndrome and significant rash.  Patient was encouraged to check skin prior to beginning.  Patient was encouraged to report any rash and to immediately stop medication.      - Continue Seroquel 50 mg at night for sleep and bipolar disorder.  Encouraged patient that she can try melatonin or magnesium gylincate OTC with the Seroquel for her sleep to see what is better effective.  Encouraged her we could go to 75 mg to see if that helps with her sleep but do not want to make her too drowsy during the day she verbalized understanding.  - Continue Wellbutrin to 300 mg XL daily as adjunct for focus and attention.  - Increase Lamictal to 50 mg daily for bipolar disorder.  Highly encouraged the patient if any significant side effects or rash or worsening symptoms to discontinue contact clinic and/or go to nearest ER she verbalized understanding.  -Encouraged patient that she may want to try methylene blue over-the-counter to see if that helps with any focus and attention.  -Informed therapist as she states she has patient placed on wait list for August if any available appointments.  As she has several scheduled in September and October.  -Encouraged patient to just talk with her daughter and get her in for counseling with someone that is certified if she is having any issues regarding the separation.  -Continuing to any ADHD symptoms.  -Ordered gene sight testing.      Counseled patient regarding multimodal approach with healthy nutrition, healthy sleep, regular physical activity, social activities, counseling, and medications.      Coping skills reviewed and  encouraged positive framing of thoughts     Assisted patient in processing above session content; acknowledged and normalized patient’s thoughts, feelings, and concerns.  Applied  positive coping skills and behavior management in session.  Allowed patient to freely discuss issues without interruption or judgment. Provided safe, confidential environment to facilitate the development of positive therapeutic relationship and encourage open, honest communication. Assisted patient in identifying risk factors which would indicate the need for higher level of care including thoughts to harm self or others and/or self-harming behavior and encouraged patient to contact this office, call 911, or present to the nearest emergency room should any of these events occur. Discussed crisis intervention services and means to access.     MEDS ORDERED DURING VISIT:  New Medications Ordered This Visit   Medications    lamoTRIgine (LaMICtal) 25 MG tablet     Sig: Take 2 tablets by mouth Daily.     Dispense:  60 tablet     Refill:  1    buPROPion XL (Wellbutrin XL) 300 MG 24 hr tablet     Sig: Take 1 tablet by mouth Daily.     Dispense:  30 tablet     Refill:  2    QUEtiapine (SEROquel) 50 MG tablet     Sig: Take 1 tablet by mouth Every Night.     Dispense:  30 tablet     Refill:  2           Follow Up   Return in about 4 weeks (around 8/14/2025), or if symptoms worsen or fail to improve, for Recheck.    Patient was given instructions and counseling regarding her condition or for health maintenance advice. Please see specific information pulled into the AVS if appropriate.     Some of the data in this electronic note has been brought forward from a previous encounter, any necessary changes have been made, it has been reviewed by this APRN, and it is accurate.      This document has been electronically signed by MARY Valencia  July 17, 2025 10:01 EDT    Part of this note may be an electronic transcription/translation of spoken  language to printed text using the Dragon Dictation System.

## 2025-08-13 ENCOUNTER — TELEMEDICINE (OUTPATIENT)
Dept: PSYCHIATRY | Facility: CLINIC | Age: 35
End: 2025-08-13
Payer: COMMERCIAL

## 2025-08-13 DIAGNOSIS — F41.1 GAD (GENERALIZED ANXIETY DISORDER): ICD-10-CM

## 2025-08-13 DIAGNOSIS — F90.8 ADHD, ADULT RESIDUAL TYPE: Primary | ICD-10-CM

## 2025-08-13 DIAGNOSIS — F31.60 BIPOLAR 1 DISORDER, MIXED: Chronic | ICD-10-CM

## 2025-08-13 RX ORDER — ATOMOXETINE 40 MG/1
40 CAPSULE ORAL DAILY
Qty: 30 CAPSULE | Refills: 2 | Status: SHIPPED | OUTPATIENT
Start: 2025-08-13

## 2025-08-13 RX ORDER — BUPROPION HYDROCHLORIDE 150 MG/1
150 TABLET ORAL EVERY MORNING
Qty: 30 TABLET | Refills: 0 | Status: SHIPPED | OUTPATIENT
Start: 2025-08-13 | End: 2025-09-12

## (undated) DEVICE — ENDOCUT SCISSOR TIP, DISPOSABLE: Brand: RENEW

## (undated) DEVICE — ANTIBACTERIAL UNDYED BRAIDED (POLYGLACTIN 910), SYNTHETIC ABSORBABLE SUTURE: Brand: COATED VICRYL

## (undated) DEVICE — CVR HNDL LIGHT RIGID

## (undated) DEVICE — FLTR PLUMEPORT LAP W/CONN STRL

## (undated) DEVICE — APPL CHLORAPREP TINTED 26ML TEAL

## (undated) DEVICE — ENDOPATH XCEL BLADELESS TROCARS WITH STABILITY SLEEVES: Brand: ENDOPATH XCEL

## (undated) DEVICE — ADHS SKIN PREMIERPRO EXOFIN TOPICAL HI/VISC .5ML

## (undated) DEVICE — KT POSTN TRENDELENBURG NBXL PAD WING STRAP TABL HDRST XLG

## (undated) DEVICE — SUT MNCRYL 0 CT1 36IN UD MCP946H

## (undated) DEVICE — IRRIGATOR BULB ASEPTO 60CC STRL

## (undated) DEVICE — ENDOPATH XCEL UNIVERSAL TROCAR STABLILITY SLEEVES: Brand: ENDOPATH XCEL

## (undated) DEVICE — GLV SURG SENSICARE PI MIC PF SZ6 LF STRL

## (undated) DEVICE — LEX GYN MINOR LAPAROSCOPY: Brand: MEDLINE INDUSTRIES, INC.

## (undated) DEVICE — HARMONIC HD 1000I SHEARS, 36CM SHAFT LENGTH: Brand: HARMONIC

## (undated) DEVICE — ENDOPOUCH RETRIEVER SPECIMEN RETRIEVAL BAGS: Brand: ENDOPOUCH RETRIEVER